# Patient Record
Sex: FEMALE | Race: BLACK OR AFRICAN AMERICAN | Employment: UNEMPLOYED | URBAN - METROPOLITAN AREA
[De-identification: names, ages, dates, MRNs, and addresses within clinical notes are randomized per-mention and may not be internally consistent; named-entity substitution may affect disease eponyms.]

---

## 2017-12-04 ENCOUNTER — HOSPITAL ENCOUNTER (INPATIENT)
Age: 47
LOS: 3 days | Discharge: HOME | DRG: 194 | End: 2017-12-07
Attending: EMERGENCY MEDICINE | Admitting: INTERNAL MEDICINE
Payer: MEDICARE

## 2017-12-04 ENCOUNTER — APPOINTMENT (OUTPATIENT)
Dept: GENERAL RADIOLOGY | Age: 47
DRG: 194 | End: 2017-12-04
Attending: EMERGENCY MEDICINE
Payer: MEDICARE

## 2017-12-04 DIAGNOSIS — E11.8 TYPE 2 DIABETES MELLITUS WITH COMPLICATION, WITH LONG-TERM CURRENT USE OF INSULIN (HCC): ICD-10-CM

## 2017-12-04 DIAGNOSIS — J45.901 ASTHMA WITH ACUTE EXACERBATION, UNSPECIFIED ASTHMA SEVERITY, UNSPECIFIED WHETHER PERSISTENT: ICD-10-CM

## 2017-12-04 DIAGNOSIS — Z79.4 TYPE 2 DIABETES MELLITUS WITH COMPLICATION, WITH LONG-TERM CURRENT USE OF INSULIN (HCC): ICD-10-CM

## 2017-12-04 DIAGNOSIS — J45.901 EXACERBATION OF PERSISTENT ASTHMA, UNSPECIFIED ASTHMA SEVERITY: ICD-10-CM

## 2017-12-04 DIAGNOSIS — J18.9 COMMUNITY ACQUIRED PNEUMONIA OF RIGHT LOWER LOBE OF LUNG: Primary | ICD-10-CM

## 2017-12-04 DIAGNOSIS — J15.9 COMMUNITY ACQUIRED BACTERIAL PNEUMONIA: ICD-10-CM

## 2017-12-04 PROBLEM — E66.9 OBESITY: Status: ACTIVE | Noted: 2017-12-04

## 2017-12-04 PROBLEM — E11.9 TYPE II DIABETES MELLITUS (HCC): Status: ACTIVE | Noted: 2017-12-04

## 2017-12-04 LAB
ALBUMIN SERPL-MCNC: 3.5 G/DL (ref 3.5–5)
ALBUMIN/GLOB SERPL: 0.9 {RATIO} (ref 1.2–3.5)
ALP SERPL-CCNC: 82 U/L (ref 50–136)
ALT SERPL-CCNC: 43 U/L (ref 12–65)
ANION GAP SERPL CALC-SCNC: 9 MMOL/L (ref 7–16)
AST SERPL-CCNC: 39 U/L (ref 15–37)
BASOPHILS # BLD: 0 K/UL (ref 0–0.2)
BASOPHILS NFR BLD: 0 % (ref 0–2)
BILIRUB SERPL-MCNC: 0.4 MG/DL (ref 0.2–1.1)
BUN SERPL-MCNC: 8 MG/DL (ref 6–23)
CALCIUM SERPL-MCNC: 8.6 MG/DL (ref 8.3–10.4)
CHLORIDE SERPL-SCNC: 105 MMOL/L (ref 98–107)
CO2 SERPL-SCNC: 26 MMOL/L (ref 21–32)
CREAT SERPL-MCNC: 0.69 MG/DL (ref 0.6–1)
DIFFERENTIAL METHOD BLD: ABNORMAL
EOSINOPHIL # BLD: 0.2 K/UL (ref 0–0.8)
EOSINOPHIL NFR BLD: 4 % (ref 0.5–7.8)
ERYTHROCYTE [DISTWIDTH] IN BLOOD BY AUTOMATED COUNT: 14.6 % (ref 11.9–14.6)
GLOBULIN SER CALC-MCNC: 3.7 G/DL (ref 2.3–3.5)
GLUCOSE BLD STRIP.AUTO-MCNC: 288 MG/DL (ref 65–100)
GLUCOSE BLD STRIP.AUTO-MCNC: 401 MG/DL (ref 65–100)
GLUCOSE SERPL-MCNC: 294 MG/DL (ref 65–100)
HCT VFR BLD AUTO: 36.9 % (ref 35.8–46.3)
HGB BLD-MCNC: 12.1 G/DL (ref 11.7–15.4)
IMM GRANULOCYTES # BLD: 0 K/UL (ref 0–0.5)
IMM GRANULOCYTES NFR BLD AUTO: 0 % (ref 0–5)
LACTATE BLD-SCNC: 1.3 MMOL/L (ref 0.5–1.9)
LYMPHOCYTES # BLD: 2.3 K/UL (ref 0.5–4.6)
LYMPHOCYTES NFR BLD: 45 % (ref 13–44)
MCH RBC QN AUTO: 28.1 PG (ref 26.1–32.9)
MCHC RBC AUTO-ENTMCNC: 32.8 G/DL (ref 31.4–35)
MCV RBC AUTO: 85.8 FL (ref 79.6–97.8)
MONOCYTES # BLD: 0.6 K/UL (ref 0.1–1.3)
MONOCYTES NFR BLD: 12 % (ref 4–12)
NEUTS SEG # BLD: 2 K/UL (ref 1.7–8.2)
NEUTS SEG NFR BLD: 39 % (ref 43–78)
PLATELET # BLD AUTO: 262 K/UL (ref 150–450)
PMV BLD AUTO: 11 FL (ref 10.8–14.1)
POTASSIUM SERPL-SCNC: 3.9 MMOL/L (ref 3.5–5.1)
PROCALCITONIN SERPL-MCNC: 0.1 NG/ML
PROT SERPL-MCNC: 7.2 G/DL (ref 6.3–8.2)
RBC # BLD AUTO: 4.3 M/UL (ref 4.05–5.25)
SODIUM SERPL-SCNC: 140 MMOL/L (ref 136–145)
WBC # BLD AUTO: 5.1 K/UL (ref 4.3–11.1)

## 2017-12-04 PROCEDURE — 83605 ASSAY OF LACTIC ACID: CPT

## 2017-12-04 PROCEDURE — 74011000258 HC RX REV CODE- 258: Performed by: PHYSICIAN ASSISTANT

## 2017-12-04 PROCEDURE — 74011250637 HC RX REV CODE- 250/637: Performed by: INTERNAL MEDICINE

## 2017-12-04 PROCEDURE — 94760 N-INVAS EAR/PLS OXIMETRY 1: CPT

## 2017-12-04 PROCEDURE — 94640 AIRWAY INHALATION TREATMENT: CPT

## 2017-12-04 PROCEDURE — 65270000029 HC RM PRIVATE

## 2017-12-04 PROCEDURE — 96365 THER/PROPH/DIAG IV INF INIT: CPT | Performed by: PHYSICIAN ASSISTANT

## 2017-12-04 PROCEDURE — 84145 PROCALCITONIN (PCT): CPT | Performed by: PHYSICIAN ASSISTANT

## 2017-12-04 PROCEDURE — 74011250636 HC RX REV CODE- 250/636: Performed by: INTERNAL MEDICINE

## 2017-12-04 PROCEDURE — 74011636637 HC RX REV CODE- 636/637: Performed by: INTERNAL MEDICINE

## 2017-12-04 PROCEDURE — 87040 BLOOD CULTURE FOR BACTERIA: CPT | Performed by: PHYSICIAN ASSISTANT

## 2017-12-04 PROCEDURE — 74011000250 HC RX REV CODE- 250: Performed by: PHYSICIAN ASSISTANT

## 2017-12-04 PROCEDURE — 71020 XR CHEST PA LAT: CPT

## 2017-12-04 PROCEDURE — 80053 COMPREHEN METABOLIC PANEL: CPT | Performed by: PHYSICIAN ASSISTANT

## 2017-12-04 PROCEDURE — 85025 COMPLETE CBC W/AUTO DIFF WBC: CPT | Performed by: PHYSICIAN ASSISTANT

## 2017-12-04 PROCEDURE — 96375 TX/PRO/DX INJ NEW DRUG ADDON: CPT | Performed by: PHYSICIAN ASSISTANT

## 2017-12-04 PROCEDURE — 82962 GLUCOSE BLOOD TEST: CPT

## 2017-12-04 PROCEDURE — 74011250636 HC RX REV CODE- 250/636: Performed by: PHYSICIAN ASSISTANT

## 2017-12-04 PROCEDURE — 99284 EMERGENCY DEPT VISIT MOD MDM: CPT | Performed by: PHYSICIAN ASSISTANT

## 2017-12-04 PROCEDURE — 74011000250 HC RX REV CODE- 250: Performed by: INTERNAL MEDICINE

## 2017-12-04 RX ORDER — ALBUTEROL SULFATE 0.83 MG/ML
2.5 SOLUTION RESPIRATORY (INHALATION)
Status: COMPLETED | OUTPATIENT
Start: 2017-12-04 | End: 2017-12-04

## 2017-12-04 RX ORDER — SERTRALINE HYDROCHLORIDE 50 MG/1
50 TABLET, FILM COATED ORAL DAILY
Status: DISCONTINUED | OUTPATIENT
Start: 2017-12-05 | End: 2017-12-07 | Stop reason: HOSPADM

## 2017-12-04 RX ORDER — LOSARTAN POTASSIUM 25 MG/1
TABLET ORAL DAILY
COMMUNITY

## 2017-12-04 RX ORDER — PANTOPRAZOLE SODIUM 40 MG/1
40 TABLET, DELAYED RELEASE ORAL DAILY
COMMUNITY

## 2017-12-04 RX ORDER — BENZONATATE 100 MG/1
100 CAPSULE ORAL
COMMUNITY
End: 2020-11-07 | Stop reason: SDUPTHER

## 2017-12-04 RX ORDER — RANITIDINE 150 MG/1
150 TABLET, FILM COATED ORAL 2 TIMES DAILY
Status: ON HOLD | COMMUNITY
End: 2017-12-07

## 2017-12-04 RX ORDER — ENOXAPARIN SODIUM 100 MG/ML
40 INJECTION SUBCUTANEOUS EVERY 12 HOURS
Status: DISCONTINUED | OUTPATIENT
Start: 2017-12-04 | End: 2017-12-07 | Stop reason: HOSPADM

## 2017-12-04 RX ORDER — OXYCODONE AND ACETAMINOPHEN 10; 325 MG/1; MG/1
1 TABLET ORAL
Status: DISCONTINUED | OUTPATIENT
Start: 2017-12-04 | End: 2017-12-07 | Stop reason: HOSPADM

## 2017-12-04 RX ORDER — IPRATROPIUM BROMIDE AND ALBUTEROL SULFATE 2.5; .5 MG/3ML; MG/3ML
3 SOLUTION RESPIRATORY (INHALATION)
Status: DISCONTINUED | OUTPATIENT
Start: 2017-12-04 | End: 2017-12-07 | Stop reason: HOSPADM

## 2017-12-04 RX ORDER — SODIUM CHLORIDE 0.9 % (FLUSH) 0.9 %
5-10 SYRINGE (ML) INJECTION AS NEEDED
Status: DISCONTINUED | OUTPATIENT
Start: 2017-12-04 | End: 2017-12-07 | Stop reason: HOSPADM

## 2017-12-04 RX ORDER — INSULIN ASPART 100 [IU]/ML
INJECTION, SOLUTION INTRAVENOUS; SUBCUTANEOUS
COMMUNITY

## 2017-12-04 RX ORDER — CEFTRIAXONE 1 G/1
1 INJECTION, POWDER, FOR SOLUTION INTRAMUSCULAR; INTRAVENOUS ONCE
Status: DISCONTINUED | OUTPATIENT
Start: 2017-12-04 | End: 2017-12-04 | Stop reason: SDUPTHER

## 2017-12-04 RX ORDER — BISOPROLOL FUMARATE AND HYDROCHLOROTHIAZIDE 5; 6.25 MG/1; MG/1
1 TABLET ORAL DAILY
Status: DISCONTINUED | OUTPATIENT
Start: 2017-12-05 | End: 2017-12-07 | Stop reason: HOSPADM

## 2017-12-04 RX ORDER — BUDESONIDE AND FORMOTEROL FUMARATE DIHYDRATE 160; 4.5 UG/1; UG/1
2 AEROSOL RESPIRATORY (INHALATION) 2 TIMES DAILY
COMMUNITY

## 2017-12-04 RX ORDER — SODIUM CHLORIDE 0.9 % (FLUSH) 0.9 %
5-10 SYRINGE (ML) INJECTION EVERY 8 HOURS
Status: DISCONTINUED | OUTPATIENT
Start: 2017-12-04 | End: 2017-12-07 | Stop reason: HOSPADM

## 2017-12-04 RX ORDER — IPRATROPIUM BROMIDE AND ALBUTEROL SULFATE 2.5; .5 MG/3ML; MG/3ML
3 SOLUTION RESPIRATORY (INHALATION)
Status: COMPLETED | OUTPATIENT
Start: 2017-12-04 | End: 2017-12-04

## 2017-12-04 RX ORDER — ONDANSETRON 2 MG/ML
4 INJECTION INTRAMUSCULAR; INTRAVENOUS
Status: DISCONTINUED | OUTPATIENT
Start: 2017-12-04 | End: 2017-12-07 | Stop reason: HOSPADM

## 2017-12-04 RX ADMIN — OXYCODONE HYDROCHLORIDE AND ACETAMINOPHEN 1 TABLET: 10; 325 TABLET ORAL at 22:48

## 2017-12-04 RX ADMIN — AZITHROMYCIN MONOHYDRATE 500 MG: 500 INJECTION, POWDER, LYOPHILIZED, FOR SOLUTION INTRAVENOUS at 22:44

## 2017-12-04 RX ADMIN — IPRATROPIUM BROMIDE AND ALBUTEROL SULFATE 3 ML: .5; 3 SOLUTION RESPIRATORY (INHALATION) at 16:29

## 2017-12-04 RX ADMIN — INSULIN HUMAN 10 UNITS: 100 INJECTION, SOLUTION PARENTERAL at 23:20

## 2017-12-04 RX ADMIN — ENOXAPARIN SODIUM 40 MG: 40 INJECTION SUBCUTANEOUS at 22:48

## 2017-12-04 RX ADMIN — ALBUTEROL SULFATE 2.5 MG: 2.5 SOLUTION RESPIRATORY (INHALATION) at 17:30

## 2017-12-04 RX ADMIN — ONDANSETRON 4 MG: 2 INJECTION INTRAMUSCULAR; INTRAVENOUS at 23:19

## 2017-12-04 RX ADMIN — IPRATROPIUM BROMIDE AND ALBUTEROL SULFATE 3 ML: .5; 3 SOLUTION RESPIRATORY (INHALATION) at 22:08

## 2017-12-04 RX ADMIN — Medication 10 ML: at 22:50

## 2017-12-04 RX ADMIN — CEFTRIAXONE 1 G: 1 INJECTION, POWDER, FOR SOLUTION INTRAMUSCULAR; INTRAVENOUS at 17:15

## 2017-12-04 RX ADMIN — METHYLPREDNISOLONE SODIUM SUCCINATE 125 MG: 125 INJECTION, POWDER, FOR SOLUTION INTRAMUSCULAR; INTRAVENOUS at 17:00

## 2017-12-04 NOTE — ED PROVIDER NOTES
HPI Comments: 41-year-old -American female with history of breast cancer in remission for 10 years, asthma, and insulin-dependent diabetes mellitus who was at [facility] ER on 11/30 with a negative chest x-ray at that time presents today stating that she has persistence of a productive cough and shortness of breath and is expectorating brown sputum. She states that she also has some blood streaked sputum. She states that she started 4 months ago with a sinusitis and was treated with amoxicillin 875 mg twice a day. She states the cough has been recurrent and is not relieved even with an albuterol or Symbicort inhaler. Patient states that she was not placed on antibiotics on 11/30 when she was at [facility].  Her chest x-ray in the ER today shows a right basilar infiltrate. Clinical exam with extensive inspiratory expiratory wheezing bilaterally. Patient is a 49 y.o. female presenting with cough. The history is provided by the patient. Cough   This is a recurrent problem. The current episode started more than 1 week ago (4 Months). The problem occurs constantly. The problem has been gradually worsening. The cough is productive of brown sputum. Patient reports a subjective fever - was not measured. The fever has been present for 5 days or more. Associated symptoms include chills, sweats, rhinorrhea, shortness of breath and wheezing. Pertinent negatives include no chest pain, no eye redness, no ear congestion, no ear pain, no headaches, no sore throat, no myalgias, no nausea and no vomiting. She has tried decongestants, cough syrup, steroids and inhalers for the symptoms. The treatment provided no relief. She is not a smoker. Her past medical history is significant for asthma.         Past Medical History:   Diagnosis Date    Cancer ([facility] [address]     left breaast    Diabetes ([facility] [address]     Gastrointestinal disorder     GERD    Hypertension        Past Surgical History: Procedure Laterality Date    BREAST SURGERY PROCEDURE UNLISTED      left mastectomy    HX GYN      c section    HX ORTHOPAEDIC      bilateral knee         History reviewed. No pertinent family history. Social History     Social History    Marital status: LEGALLY      Spouse name: N/A    Number of children: N/A    Years of education: N/A     Occupational History    Not on file. Social History Main Topics    Smoking status: Never Smoker    Smokeless tobacco: Never Used    Alcohol use No    Drug use: No    Sexual activity: Not on file     Other Topics Concern    Not on file     Social History Narrative         ALLERGIES: Adhesive tape    Review of Systems   Constitutional: Positive for chills. Negative for diaphoresis, fatigue and fever. HENT: Positive for congestion, postnasal drip, rhinorrhea, sinus pain, sinus pressure and sneezing. Negative for ear pain, sore throat, trouble swallowing and voice change. Eyes: Negative. Negative for redness. Respiratory: Positive for cough, shortness of breath and wheezing. Cardiovascular: Negative. Negative for chest pain and palpitations. Gastrointestinal: Negative. Negative for nausea and vomiting. Genitourinary: Negative. Musculoskeletal: Negative. Negative for myalgias. Skin: Negative. Neurological: Positive for dizziness and light-headedness. Negative for syncope, speech difficulty, weakness, numbness and headaches. Hematological: Negative. Psychiatric/Behavioral: Negative. All other systems reviewed and are negative. Vitals:    12/04/17 1431   BP: (!) 161/99   Pulse: (!) 103   Resp: 20   Temp: 99.1 °F (37.3 °C)   SpO2: 98%   Weight: (!) 163.3 kg (360 lb)   Height: 5' 5\" (1.651 m)            Physical Exam   Constitutional: She is oriented to person, place, and time. Non-toxic appearance. She appears ill. She appears distressed. Patient is morbidly obese.   She is observed to be in obvious respiratory discomfort sitting on exam stretcher. Patient with obvious retractions and wheezing. She had difficulty speaking in full complete sentences due to wheezing and  Shortness of breath. Patient is also tachycardic. HENT:   Head: Normocephalic and atraumatic. Right Ear: Tympanic membrane normal.   Left Ear: Tympanic membrane normal.   Nose: Mucosal edema and rhinorrhea present. Mouth/Throat: Uvula is midline, oropharynx is clear and moist and mucous membranes are normal.   Eyes: Conjunctivae and EOM are normal. Pupils are equal, round, and reactive to light. Neck: Trachea normal, normal range of motion and full passive range of motion without pain. Neck supple. No JVD present. No spinous process tenderness and no muscular tenderness present. Cardiovascular: Regular rhythm and intact distal pulses. Tachycardia present. Exam reveals no gallop and no friction rub. No murmur heard. Pulmonary/Chest: Accessory muscle usage present. She is in respiratory distress. She has decreased breath sounds. She has wheezes. She has rhonchi. She has no rales. Patient with inspiratory and expiratory wheezes bilaterally. Abdominal: Soft. Bowel sounds are normal. She exhibits no distension. There is no tenderness. There is no rebound and no guarding. Musculoskeletal: Normal range of motion. She exhibits no edema. Lymphadenopathy:     She has no cervical adenopathy. Neurological: She is alert and oriented to person, place, and time. Skin: Skin is warm and dry. Psychiatric: She has a normal mood and affect. Her behavior is normal.   Nursing note and vitals reviewed.        MDM  Number of Diagnoses or Management Options  Community acquired pneumonia of right lower lobe of lung ([address]): new and requires workup  Exacerbation of persistent asthma, unspecified asthma severity: new and requires workup  Type 2 diabetes mellitus with complication, with long-term current use of insulin ([address]): established and worsening  Diagnosis management comments: Patient had both DuoNeb and continuous albuterol treatment. She persisted to be symptomatic with inspiratory and expiratory wheezes following the treatment. Her SaO2 remained stable, however she was subjectively and objectively short of breath. Patient was also administered Solu-Medrol 125 mg IM ×1. Her blood glucose is elevated to 288. Patient received Rocephin 1 g IV ×1 in the ER. I spoke with the intensivist who agreed to accept her to his service for intractable wheezing with asthma exacerbation and right lower lobe community acquired pneumonia. Amount and/or Complexity of Data Reviewed  Clinical lab tests: ordered and reviewed  Tests in the radiology section of CPT®: ordered and reviewed  Review and summarize past medical records: yes  Discuss the patient with other providers: yes    Risk of Complications, Morbidity, and/or Mortality  Presenting problems: high  Diagnostic procedures: moderate  Management options: high    Patient Progress  Patient progress: stable    ED Course       Procedures      Recent Results (from the past 12 hour(s))   CBC WITH AUTOMATED DIFF    Collection Time: 12/04/17  5:43 PM   Result Value Ref Range    WBC 5.1 4.3 - 11.1 K/uL    RBC 4.30 4.05 - 5.25 M/uL    HGB 12.1 11.7 - 15.4 g/dL    HCT 36.9 35.8 - 46.3 %    MCV 85.8 79.6 - 97.8 FL    MCH 28.1 26.1 - 32.9 PG    MCHC 32.8 31.4 - 35.0 g/dL    RDW 14.6 11.9 - 14.6 %    PLATELET 702 225 - 808 K/uL    MPV 11.0 10.8 - 14.1 FL    DF AUTOMATED      NEUTROPHILS 39 (L) 43 - 78 %    LYMPHOCYTES 45 (H) 13 - 44 %    MONOCYTES 12 4.0 - 12.0 %    EOSINOPHILS 4 0.5 - 7.8 %    BASOPHILS 0 0.0 - 2.0 %    IMMATURE GRANULOCYTES 0 0.0 - 5.0 %    ABS. NEUTROPHILS 2.0 1.7 - 8.2 K/UL    ABS. LYMPHOCYTES 2.3 0.5 - 4.6 K/UL    ABS. MONOCYTES 0.6 0.1 - 1.3 K/UL    ABS. EOSINOPHILS 0.2 0.0 - 0.8 K/UL    ABS. BASOPHILS 0.0 0.0 - 0.2 K/UL    ABS. IMM.  GRANS. 0.0 0.0 - 0.5 K/UL   METABOLIC PANEL, COMPREHENSIVE    Collection Time: 12/04/17  5:43 PM   Result Value Ref Range    Sodium 140 136 - 145 mmol/L    Potassium 3.9 3.5 - 5.1 mmol/L    Chloride 105 98 - 107 mmol/L    CO2 26 21 - 32 mmol/L    Anion gap 9 7 - 16 mmol/L    Glucose 294 (H) 65 - 100 mg/dL    BUN 8 6 - 23 MG/DL    Creatinine 0.69 0.6 - 1.0 MG/DL    GFR est AA >60 >60 ml/min/1.73m2    GFR est non-AA >60 >60 ml/min/1.73m2    Calcium 8.6 8.3 - 10.4 MG/DL    Bilirubin, total 0.4 0.2 - 1.1 MG/DL    ALT (SGPT) 43 12 - 65 U/L    AST (SGOT) 39 (H) 15 - 37 U/L    Alk. phosphatase 82 50 - 136 U/L    Protein, total 7.2 6.3 - 8.2 g/dL    Albumin 3.5 3.5 - 5.0 g/dL    Globulin 3.7 (H) 2.3 - 3.5 g/dL    A-G Ratio 0.9 (L) 1.2 - 3.5     PROCALCITONIN    Collection Time: 12/04/17  5:43 PM   Result Value Ref Range    Procalcitonin 0.1 ng/mL   GLUCOSE, POC    Collection Time: 12/04/17  5:51 PM   Result Value Ref Range    Glucose (POC) 288 (H) 65 - 100 mg/dL   POC LACTIC ACID    Collection Time: 12/04/17  5:52 PM   Result Value Ref Range    Lactic Acid (POC) 1.3 0.5 - 1.9 mmol/L        XR CHEST PA LAT   Final Result   IMPRESSION: Right basilar infiltrate.

## 2017-12-04 NOTE — ED NOTES
Pt feeling somewhat better after first neb tx, but still with wheezing, requesting a second tx. RT notified.

## 2017-12-04 NOTE — IP AVS SNAPSHOT
[address] [facility] 
[phone] Patient: Campbell Favre MRN: [id number] :1970 About your hospitalization You were admitted on:  2017 You last received care in the:  [facility] 7 MED SURG You were discharged on:  2017 Why you were hospitalized Your primary diagnosis was:  Community Acquired Bacterial Pneumonia Your diagnoses also included:  Acute Asthma Exacerbation, Type Ii Diabetes Mellitus (Hcc), Obesity Things You Need To Do (next 8 weeks) Monday Dec 11, 2017 New Patient with Max Shepherd MD at  4:20 PM  
Where:  [address] [place] ([address]) Monday Dec 18, 2017 HOSPITAL with JACQUELINE Oconnor at  9:20 AM (Arrive by 8:50 AM) Where:  [facility] ([facility]) Follow up with [facility]  
at 8:50 a.m with Mariah Meyers Phone:  [phone] Where:  [address] [address] Wednesday Dec 27, 2017 Follow Up with  SLEEP LUL YAN at  9:20 AM (Arrive by 9:15 AM) Where:  [address] ([facility]) Follow up with [facility]  
at 9:15 a.m. Please bring records from sleep study that was previously done, and take them to the office before appointment, BRING bi-pap to appointment Phone:  [phone] Where:  [address] [address] Discharge Orders None A check tawanna indicates which time of day the medication should be taken. My Medications STOP taking these medications   
 azithromycin 250 mg tablet Commonly known as:  ZITHROMAX Z-STEFFI  
   
  
  
TAKE these medications as instructed Instructions Each Dose to Equal  
 Morning Noon Evening Bedtime  
 albuterol 2.5 mg /3 mL (0.083 %) nebulizer solution Commonly known as:  PROVENTIL VENTOLIN  
   
 3 mL by Nebulization route four (4) times daily. 2.5 mg  
    
   
   
   
  
 albuterol 90 mcg/actuation inhaler Commonly known as:  Ashley Deep Your next dose is: Take on as needed schedule Take 1-2 Puffs by inhalation every four (4) hours as needed for Wheezing. 1-2 Puff  
    
   
   
   
  
 bisoprolol-hydroCHLOROthiazide 5-6.25 mg per tablet Commonly known as:  [facility] Your next dose is:  Tomorrow Morning Take 1 Tab by mouth daily. 1 Tab  
    
  
   
   
   
  
 cefpodoxime 200 mg tablet Commonly known as:  Margueritte Sykes Take 0.5 Tabs by mouth two (2) times a day. 100 mg  
    
   
   
   
  
 fexofenadine-pseudoephedrine 180-240 mg per tablet Commonly known as:  ALLEGRA-D 24 Your next dose is:  Tomorrow Morning Take 1 Tab by mouth daily. 1 Tab FLEXERIL 5 mg tablet Generic drug:  cyclobenzaprine Your next dose is: This evening 
  
   
 take 5 mg by mouth two (2) times a day. 5 mg  
    
  
   
   
  
   
  
 fluconazole 150 mg tablet Commonly known as:  DIFLUCAN Take 1 Tab by mouth once for 1 dose. TAKE ONE TAB FOR CANDIDIASIS. MAY REPEAT X 1 IF NEEDED. 150 mg  
    
   
   
   
  
 insulin aspart 100 unit/mL Inpn Commonly known as:  Dinesh Vieira Your next dose is:  Resume home schedule 
  
   
 by SubCUTAneous route. LASIX 20 mg tablet Generic drug:  furosemide Your next dose is:  Tomorrow Morning Take 40 mg by mouth daily. 40 mg  
    
  
   
   
   
  
 LEVEMIR 100 unit/mL injection Generic drug:  insulin detemir Your next dose is: Take tonight  
  
   
 by SubCUTAneous route nightly. Liraglutide 0.6 mg/0.1 mL (18 mg/3 mL) Pnij Commonly known as:  Gattis Halon Your next dose is:  Resume home schedule 
  
   
 0.6 mg by SubCUTAneous route.   
 0.6 mg  
    
   
   
   
  
 losartan 25 mg tablet Commonly known as:  COZAAR Your next dose is:  Tomorrow Morning Take  by mouth daily. magic mouthwash Susp Commonly known as:  [place] Take 10 mL by mouth every four (4) hours as needed. 10 mL  
    
   
   
   
  
 metFORMIN 500 mg tablet Commonly known as:  GLUCOPHAGE Your next dose is: This evening Take 1,000 mg by mouth two (2) times daily (with meals). 1000 mg MOBIC 7.5 mg tablet Generic drug:  meloxicam  
Your next dose is: This evening 
  
   
 take 7.5 mg by mouth two (2) times a day. 7.5 mg  
    
  
   
   
  
   
  
 NEURONTIN PO Your next dose is:  Resume home schedule Take  by mouth. OTHER Takes insulin that starts with L, and starts with N  
     
   
   
   
  
 PERCOCET  mg per tablet Generic drug:  oxyCODONE-acetaminophen Your next dose is: Take on as needed schedule Take 1 Tab by mouth. 1 Tab  
    
   
   
   
  
 predniSONE 10 mg tablet Commonly known as:  Marla Patricio Take 1 Tab by mouth daily (with breakfast). 40mg per day for 3 days, then 30mg per day for 3 days, 20mg per day for 3 days then 10mg per day for 3 days, then stop. 10 mg PROTONIX 40 mg tablet Generic drug:  pantoprazole Take 40 mg by mouth daily. 40 mg  
    
   
   
   
  
 raNITIdine 150 mg tablet Commonly known as:  ZANTAC Take 1 Tab by mouth two (2) times daily as needed for Indigestion. 150 mg  
    
   
   
   
  
 sertraline 50 mg tablet Commonly known as:  ZOLOFT Your next dose is:  Tomorrow Morning Take 50 mg by mouth daily. 50 mg  
    
  
   
   
   
  
 SYMBICORT 160-4.5 mcg/actuation Hfaa Generic drug:  budesonide-formoterol Your next dose is: This evening Take 2 Puffs by inhalation two (2) times a day. 2 Puff TESSALON PERLES 100 mg capsule Generic drug:  benzonatate Your next dose is: Take on as needed schedule Take 100 mg by mouth three (3) times daily as needed for Cough. 100 mg Where to Get Your Medications Information on where to get these meds will be given to you by the nurse or doctor. ! Ask your nurse or doctor about these medications  
  albuterol 2.5 mg /3 mL (0.083 %) nebulizer solution  
 cefpodoxime 200 mg tablet  
 fluconazole 150 mg tablet  
 magic mouthwash Susp  
 predniSONE 10 mg tablet  
 raNITIdine 150 mg tablet Discharge Instructions Pneumonia: Care Instructions Your Care Instructions Pneumonia is an infection of the lungs. Most cases are caused by infections from bacteria or viruses. Pneumonia may be mild or very severe. If it is caused by bacteria, you will be treated with antibiotics. It may take a few weeks to a few months to recover fully from pneumonia, depending on how sick you were and whether your overall health is good. Follow-up care is a key part of your treatment and safety. Be sure to make and go to all appointments, and call your doctor if you are having problems. It's also a good idea to know your test results and keep a list of the medicines you take. How can you care for yourself at home? · Take your antibiotics exactly as directed. Do not stop taking the medicine just because you are feeling better. You need to take the full course of antibiotics. · Take your medicines exactly as prescribed. Call your doctor if you think you are having a problem with your medicine. · Get plenty of rest and sleep. You may feel weak and tired for a while, but your energy level will improve with time. · To prevent dehydration, drink plenty of fluids, enough so that your urine is light yellow or clear like water. Choose water and other caffeine-free clear liquids until you feel better.  If you have kidney, heart, or liver disease and have to limit fluids, talk with your doctor before you increase the amount of fluids you drink. · Take care of your cough so you can rest. A cough that brings up mucus from your lungs is common with pneumonia. It is one way your body gets rid of the infection. But if coughing keeps you from resting or causes severe fatigue and chest-wall pain, talk to your doctor. He or she may suggest that you take a medicine to reduce the cough. · Use a vaporizer or humidifier to add moisture to your bedroom. Follow the directions for cleaning the machine. · Do not smoke or allow others to smoke around you. Smoke will make your cough last longer. If you need help quitting, talk to your doctor about stop-smoking programs and medicines. These can increase your chances of quitting for good. · Take an over-the-counter pain medicine, such as acetaminophen (Tylenol), ibuprofen (Advil, Motrin), or naproxen (Aleve). Read and follow all instructions on the label. · Do not take two or more pain medicines at the same time unless the doctor told you to. Many pain medicines have acetaminophen, which is Tylenol. Too much acetaminophen (Tylenol) can be harmful. · If you were given a spirometer to measure how well your lungs are working, use it as instructed. This can help your doctor tell how your recovery is going. · To prevent pneumonia in the future, talk to your doctor about getting a flu vaccine (once a year) and a pneumococcal vaccine (one time only for most people). When should you call for help? Call 911 anytime you think you may need emergency care. For example, call if: 
? · You have severe trouble breathing. ?Call your doctor now or seek immediate medical care if: 
? · You cough up dark brown or bloody mucus (sputum). ? · You have new or worse trouble breathing. ? · You are dizzy or lightheaded, or you feel like you may faint. ?Watch closely for changes in your health, and be sure to contact your doctor if: 
? · You have a new or higher fever. ? · You are coughing more deeply or more often. ? · You are not getting better after 2 days (48 hours). ? · You do not get better as expected. Where can you learn more? Go to http://amarilys-thee.info/. Enter [id number] in the search box to learn more about \"Pneumonia: Care Instructions. \" Current as of: May 12, 2017 Content Version: 11.4 © 6890-1553 [employer]. Care instructions adapted under license by [employer] (which disclaims liability or warranty for this information). If you have questions about a medical condition or this instruction, always ask your healthcare professional. [address] any warranty or liability for your use of this information. DISCHARGE SUMMARY from Nurse PATIENT INSTRUCTIONS: 
 
After general anesthesia or intravenous sedation, for 24 hours or while taking prescription Narcotics: · Limit your activities · Do not drive and operate hazardous machinery · Do not make important personal or business decisions · Do  not drink alcoholic beverages · If you have not urinated within 8 hours after discharge, please contact your surgeon on call. Report the following to your surgeon: 
· Excessive pain, swelling, redness or odor of or around the surgical area · Temperature over 100.5 · Nausea and vomiting lasting longer than 4 hours or if unable to take medications · Any signs of decreased circulation or nerve impairment to extremity: change in color, persistent  numbness, tingling, coldness or increase pain · Any questions What to do at Home: 
Recommended activity: Activity as tolerated, diabetic diet as tolerated. *  Please give a list of your current medications to your Primary Care Provider.  
 
*  Please update this list whenever your medications are discontinued, doses are 
 changed, or new medications (including over-the-counter products) are added. *  Please carry medication information at all times in case of emergency situations. These are general instructions for a healthy lifestyle: No smoking/ No tobacco products/ Avoid exposure to second hand smoke Surgeon General's Warning:  Quitting smoking now greatly reduces serious risk to your health. Obesity, smoking, and sedentary lifestyle greatly increases your risk for illness A healthy diet, regular physical exercise & weight monitoring are important for maintaining a healthy lifestyle You may be retaining fluid if you have a history of heart failure or if you experience any of the following symptoms:  Weight gain of 3 pounds or more overnight or 5 pounds in a week, increased swelling in our hands or feet or shortness of breath while lying flat in bed. Please call your doctor as soon as you notice any of these symptoms; do not wait until your next office visit. Recognize signs and symptoms of STROKE: 
 
F-face looks uneven A-arms unable to move or move unevenly S-speech slurred or non-existent T-time-call 911 as soon as signs and symptoms begin-DO NOT go Back to bed or wait to see if you get better-TIME IS BRAIN. Warning Signs of HEART ATTACK Call 911 if you have these symptoms: 
? Chest discomfort. Most heart attacks involve discomfort in the center of the chest that lasts more than a few minutes, or that goes away and comes back. It can feel like uncomfortable pressure, squeezing, fullness, or pain. ? Discomfort in other areas of the upper body. Symptoms can include pain or discomfort in one or both arms, the back, neck, jaw, or stomach. ? Shortness of breath with or without chest discomfort. ? Other signs may include breaking out in a cold sweat, nausea, or lightheadedness. Don't wait more than five minutes to call [address]!  Fast action can save your life. Calling 911 is almost always the fastest way to get lifesaving treatment. Emergency Medical Services staff can begin treatment when they arrive  up to an hour sooner than if someone gets to the hospital by car. The discharge information has been reviewed with the patient. The patient verbalized understanding. Discharge medications reviewed with the patient and appropriate educational materials and side effects teaching were provided. ___________________________________________________________________________________________________________________________________ [employer] Announcement We are excited to announce that we are making your provider's discharge notes available to you in [employer]. You will see these notes when they are completed and signed by the physician that discharged you from your recent hospital stay. If you have any questions or concerns about any information you see in [employer], please call the Health Information Department where you were seen or reach out to your Primary Care Provider for more information about your plan of care. Introducing [facility]! Petra Romero introduces [employer] patient portal. Now you can access parts of your medical record, email your doctor's office, and request medication refills online. 1. In your internet browser, go to [email or web address] 2. Click on the First Time User? Click Here link in the Sign In box. You will see the New Member Sign Up page. 3. Enter your [employer] Access Code exactly as it appears below. You will not need to use this code after youve completed the sign-up process. If you do not sign up before the expiration date, you must request a new code. · [employer] Access Code: [email or web address] Expires: 3/4/2018  2:17 PM 
 
4. Enter the last four digits of your Social Security Number (xxxx) and Date of Birth (mm/dd/yyyy) as indicated and click Submit.  You will be taken to the next sign-up page. 5. Create a [employer] ID. This will be your [employer] login ID and cannot be changed, so think of one that is secure and easy to remember. 6. Create a [employer] password. You can change your password at any time. 7. Enter your Password Reset Question and Answer. This can be used at a later time if you forget your password. 8. Enter your e-mail address. You will receive e-mail notification when new information is available in [address] 9. Click Sign Up. You can now view and download portions of your medical record. 10. Click the Download Summary menu link to download a portable copy of your medical information. If you have questions, please visit the Frequently Asked Questions section of the [employer] website. Remember, [employer] is NOT to be used for urgent needs. For medical emergencies, dial 911. Now available from your iPhone and Android! Unresulted Labs-Please follow up with your PCP about these lab tests Order Current Status CULTURE, BLOOD Preliminary result CULTURE, BLOOD Preliminary result Providers Seen During Your Hospitalization Provider Specialty Primary office phone Carlin Contreras MD Emergency Medicine [phone] Josselyn Arroyo MD Emergency Medicine [phone] Patience Vitale MD Pulmonary Disease [phone] Your Primary Care Physician (PCP) Primary Care Physician Office Phone Office Fax Kimmy Limon [phone] You are allergic to the following Allergen Reactions Adhesive Tape Rash Recent Documentation Height Weight BMI OB Status Smoking Status 1.651 m (!) 163.3 kg 59.91 kg/m2 Needs review Never Smoker Emergency Contacts Name Discharge Info Relation Home Work Mobile [id number] [employer]  Parent [1] [phone] Patient Belongings The following personal items are in your possession at time of discharge: Dental Appliances: None         Home Medications: None   Jewelry: With patient  Clothing: At bedside    Other Valuables: At bedside Please provide this summary of care documentation to your next provider. Signatures-by signing, you are acknowledging that this After Visit Summary has been reviewed with you and you have received a copy. Patient Signature:  ____________________________________________________________ Date:  ____________________________________________________________  
  
Bethel Moulding Provider Signature:  ____________________________________________________________ Date:  ____________________________________________________________

## 2017-12-04 NOTE — ED TRIAGE NOTES
Patient states went to [facility] on Wednesday diagnosed with bronchitis. Patient states cough that started on Thursday. States attempt mucinex. Patient states coughing up brown mucous. States chest hurts from coughing.

## 2017-12-05 PROBLEM — E66.9 OBESITY: Chronic | Status: ACTIVE | Noted: 2017-12-04

## 2017-12-05 PROBLEM — E11.9 TYPE II DIABETES MELLITUS (HCC): Chronic | Status: ACTIVE | Noted: 2017-12-04

## 2017-12-05 LAB
ANION GAP SERPL CALC-SCNC: 10 MMOL/L (ref 7–16)
BUN SERPL-MCNC: 9 MG/DL (ref 6–23)
CALCIUM SERPL-MCNC: 8.5 MG/DL (ref 8.3–10.4)
CHLORIDE SERPL-SCNC: 104 MMOL/L (ref 98–107)
CO2 SERPL-SCNC: 25 MMOL/L (ref 21–32)
CREAT SERPL-MCNC: 0.69 MG/DL (ref 0.6–1)
ERYTHROCYTE [DISTWIDTH] IN BLOOD BY AUTOMATED COUNT: 14.4 % (ref 11.9–14.6)
GLUCOSE BLD STRIP.AUTO-MCNC: 209 MG/DL (ref 65–100)
GLUCOSE BLD STRIP.AUTO-MCNC: 242 MG/DL (ref 65–100)
GLUCOSE BLD STRIP.AUTO-MCNC: 246 MG/DL (ref 65–100)
GLUCOSE BLD STRIP.AUTO-MCNC: 267 MG/DL (ref 65–100)
GLUCOSE BLD STRIP.AUTO-MCNC: 288 MG/DL (ref 65–100)
GLUCOSE BLD STRIP.AUTO-MCNC: 318 MG/DL (ref 65–100)
GLUCOSE BLD STRIP.AUTO-MCNC: 427 MG/DL (ref 65–100)
GLUCOSE SERPL-MCNC: 255 MG/DL (ref 65–100)
HCT VFR BLD AUTO: 37.2 % (ref 35.8–46.3)
HGB BLD-MCNC: 11.8 G/DL (ref 11.7–15.4)
MCH RBC QN AUTO: 27.6 PG (ref 26.1–32.9)
MCHC RBC AUTO-ENTMCNC: 31.7 G/DL (ref 31.4–35)
MCV RBC AUTO: 86.9 FL (ref 79.6–97.8)
PLATELET # BLD AUTO: 276 K/UL (ref 150–450)
PMV BLD AUTO: 11.4 FL (ref 10.8–14.1)
POTASSIUM SERPL-SCNC: 4.5 MMOL/L (ref 3.5–5.1)
RBC # BLD AUTO: 4.28 M/UL (ref 4.05–5.25)
SODIUM SERPL-SCNC: 139 MMOL/L (ref 136–145)
WBC # BLD AUTO: 6.8 K/UL (ref 4.3–11.1)

## 2017-12-05 PROCEDURE — 74011250637 HC RX REV CODE- 250/637: Performed by: INTERNAL MEDICINE

## 2017-12-05 PROCEDURE — 94760 N-INVAS EAR/PLS OXIMETRY 1: CPT

## 2017-12-05 PROCEDURE — 65270000029 HC RM PRIVATE

## 2017-12-05 PROCEDURE — 74011636637 HC RX REV CODE- 636/637: Performed by: NURSE PRACTITIONER

## 2017-12-05 PROCEDURE — 36415 COLL VENOUS BLD VENIPUNCTURE: CPT | Performed by: INTERNAL MEDICINE

## 2017-12-05 PROCEDURE — 82962 GLUCOSE BLOOD TEST: CPT

## 2017-12-05 PROCEDURE — 80048 BASIC METABOLIC PNL TOTAL CA: CPT | Performed by: INTERNAL MEDICINE

## 2017-12-05 PROCEDURE — 85027 COMPLETE CBC AUTOMATED: CPT | Performed by: INTERNAL MEDICINE

## 2017-12-05 PROCEDURE — 74011250636 HC RX REV CODE- 250/636: Performed by: INTERNAL MEDICINE

## 2017-12-05 PROCEDURE — 74011000258 HC RX REV CODE- 258: Performed by: INTERNAL MEDICINE

## 2017-12-05 PROCEDURE — 74011636637 HC RX REV CODE- 636/637: Performed by: INTERNAL MEDICINE

## 2017-12-05 PROCEDURE — 94640 AIRWAY INHALATION TREATMENT: CPT

## 2017-12-05 PROCEDURE — 99232 SBSQ HOSP IP/OBS MODERATE 35: CPT | Performed by: INTERNAL MEDICINE

## 2017-12-05 PROCEDURE — 74011000250 HC RX REV CODE- 250: Performed by: INTERNAL MEDICINE

## 2017-12-05 RX ORDER — LOSARTAN POTASSIUM 25 MG/1
25 TABLET ORAL DAILY
Status: DISCONTINUED | OUTPATIENT
Start: 2017-12-06 | End: 2017-12-07 | Stop reason: HOSPADM

## 2017-12-05 RX ORDER — PREDNISONE 20 MG/1
40 TABLET ORAL
Status: DISCONTINUED | OUTPATIENT
Start: 2017-12-06 | End: 2017-12-06

## 2017-12-05 RX ORDER — BENZONATATE 100 MG/1
100 CAPSULE ORAL
Status: DISCONTINUED | OUTPATIENT
Start: 2017-12-05 | End: 2017-12-07 | Stop reason: HOSPADM

## 2017-12-05 RX ORDER — INSULIN GLARGINE 100 [IU]/ML
10 INJECTION, SOLUTION SUBCUTANEOUS
Status: DISCONTINUED | OUTPATIENT
Start: 2017-12-05 | End: 2017-12-05

## 2017-12-05 RX ORDER — INSULIN GLARGINE 100 [IU]/ML
15 INJECTION, SOLUTION SUBCUTANEOUS
Status: DISCONTINUED | OUTPATIENT
Start: 2017-12-05 | End: 2017-12-07 | Stop reason: HOSPADM

## 2017-12-05 RX ADMIN — IPRATROPIUM BROMIDE AND ALBUTEROL SULFATE 3 ML: .5; 3 SOLUTION RESPIRATORY (INHALATION) at 11:48

## 2017-12-05 RX ADMIN — ENOXAPARIN SODIUM 40 MG: 40 INJECTION SUBCUTANEOUS at 21:07

## 2017-12-05 RX ADMIN — Medication 10 ML: at 21:07

## 2017-12-05 RX ADMIN — OXYCODONE HYDROCHLORIDE AND ACETAMINOPHEN 1 TABLET: 10; 325 TABLET ORAL at 18:30

## 2017-12-05 RX ADMIN — Medication 10 ML: at 18:31

## 2017-12-05 RX ADMIN — CEFTRIAXONE SODIUM 1 G: 1 INJECTION, POWDER, FOR SOLUTION INTRAMUSCULAR; INTRAVENOUS at 18:30

## 2017-12-05 RX ADMIN — Medication 10 ML: at 06:00

## 2017-12-05 RX ADMIN — ENOXAPARIN SODIUM 40 MG: 40 INJECTION SUBCUTANEOUS at 09:16

## 2017-12-05 RX ADMIN — Medication 10 ML: at 21:11

## 2017-12-05 RX ADMIN — IPRATROPIUM BROMIDE AND ALBUTEROL SULFATE 3 ML: .5; 3 SOLUTION RESPIRATORY (INHALATION) at 08:56

## 2017-12-05 RX ADMIN — AZITHROMYCIN MONOHYDRATE 500 MG: 500 INJECTION, POWDER, LYOPHILIZED, FOR SOLUTION INTRAVENOUS at 21:05

## 2017-12-05 RX ADMIN — IPRATROPIUM BROMIDE AND ALBUTEROL SULFATE 3 ML: .5; 3 SOLUTION RESPIRATORY (INHALATION) at 20:56

## 2017-12-05 RX ADMIN — ONDANSETRON 4 MG: 2 INJECTION INTRAMUSCULAR; INTRAVENOUS at 21:53

## 2017-12-05 RX ADMIN — BENZONATATE 100 MG: 100 CAPSULE ORAL at 03:27

## 2017-12-05 RX ADMIN — IPRATROPIUM BROMIDE AND ALBUTEROL SULFATE 3 ML: .5; 3 SOLUTION RESPIRATORY (INHALATION) at 23:37

## 2017-12-05 RX ADMIN — OXYCODONE HYDROCHLORIDE AND ACETAMINOPHEN 1 TABLET: 10; 325 TABLET ORAL at 09:16

## 2017-12-05 RX ADMIN — INSULIN HUMAN 4 UNITS: 100 INJECTION, SOLUTION PARENTERAL at 08:08

## 2017-12-05 RX ADMIN — METHYLPREDNISOLONE SODIUM SUCCINATE 40 MG: 40 INJECTION, POWDER, FOR SOLUTION INTRAMUSCULAR; INTRAVENOUS at 06:13

## 2017-12-05 RX ADMIN — INSULIN GLARGINE 15 UNITS: 100 INJECTION, SOLUTION SUBCUTANEOUS at 21:08

## 2017-12-05 RX ADMIN — IPRATROPIUM BROMIDE AND ALBUTEROL SULFATE 3 ML: .5; 3 SOLUTION RESPIRATORY (INHALATION) at 03:05

## 2017-12-05 RX ADMIN — BENZONATATE 100 MG: 100 CAPSULE ORAL at 21:06

## 2017-12-05 RX ADMIN — INSULIN HUMAN 6 UNITS: 100 INJECTION, SOLUTION PARENTERAL at 18:31

## 2017-12-05 RX ADMIN — SERTRALINE HYDROCHLORIDE 50 MG: 50 TABLET ORAL at 08:08

## 2017-12-05 RX ADMIN — INSULIN HUMAN 10 UNITS: 100 INJECTION, SOLUTION PARENTERAL at 18:29

## 2017-12-05 RX ADMIN — INSULIN HUMAN 15 UNITS: 100 INJECTION, SOLUTION PARENTERAL at 00:52

## 2017-12-05 NOTE — PROGRESS NOTES
Patient is a potential Pneumonia Bundle Payment for Care Improvement (BPCI) patient and will be followed for 90 days post acute care. RRAT- 6  PCP- recently on disability, and needs PCP. Consider [address] [facility])    Pneumonia and Asthma education given. Continue to follow with needs.      Faizan Villegas, RN- [facility], BSN  Care Transition/ Bundled Payment Navigator  [phone]

## 2017-12-05 NOTE — PROGRESS NOTES
Problem: Falls - Risk of  Goal: *Absence of Falls  Document Warren Fall Risk and appropriate interventions in the flowsheet.    Outcome: Progressing Towards Goal  Fall Risk Interventions:            Medication Interventions: Assess postural VS orthostatic hypotension, Patient to call before getting OOB

## 2017-12-05 NOTE — ED NOTES
TRANSFER - OUT REPORT:    Verbal report given to Abhi Zafar on Sol Shutter  being transferred to [facility] 50 30 for routine progression of care       Report consisted of patients Situation, Background, Assessment and   Recommendations(SBAR). Information from the following report(s) SBAR, Kardex, ED Summary, MAR, Recent Results and Med Rec Status was reviewed with the receiving nurse. Lines:   Peripheral IV 12/04/17 Right Forearm (Active)   Site Assessment Clean, dry, & intact 12/4/2017  5:00 PM   Phlebitis Assessment 0 12/4/2017  5:00 PM   Infiltration Assessment 0 12/4/2017  5:00 PM   Dressing Status Clean, dry, & intact 12/4/2017  5:00 PM        Opportunity for questions and clarification was provided.       Patient transported with:   Lonely Sock

## 2017-12-05 NOTE — H&P
HISTORY AND PHYSICAL      [place] Nearing    12/4/2017    Date of Admission:  12/4/2017    The patient's chart is reviewed and the patient is discussed with the staff. Subjective:     Patient is a 49 y.o.  female presents with shortness of breath    Patient is known with asthma, obesity, DM on insulin who is coming in with shortness of breath for 3 days. She states that she has not been feeling well for almost 3-4 weeks and was on multiple courses of antibiotics with minimal improvement but has gotten much worse over the last couple of days not able to sleep from coughing and being short of breath. She has wheezing. No fever chills or rigors but feels exhausted. She does not smoke. No pets at home. Patient has history of breast cancer 6 yrs ago. Review of Systems  A comprehensive review of systems was negative except for that written in the HPI. Patient Active Problem List   Diagnosis Code    Community acquired bacterial pneumonia J15.9    Acute asthma exacerbation J45. 0    Type II diabetes mellitus (HCC) E11.9    Obesity E66.9       Prior to Admission Medications   Prescriptions Last Dose Informant Patient Reported? Taking? FLEXERIL 5 mg Tab  Self Yes No   Sig: take 5 mg by mouth two (2) times a day. GABAPENTIN (NEURONTIN PO)   Yes No   Sig: Take  by mouth. OTHER  Self Yes No   Sig: Takes insulin that starts with L, and starts with N    albuterol (PROVENTIL, VENTOLIN) 90 mcg/actuation inhaler   No No   Sig: Take 1-2 Puffs by inhalation every four (4) hours as needed for Wheezing. azithromycin (ZITHROMAX Z-STEFFI) 250 mg tablet   No No   Sig: Take as directed   bisoprolol-hydrochlorothiazide (ZIAC) 5-6.25 mg per tablet  Self Yes No   Sig: Take 1 Tab by mouth daily. fexofenadine-pseudoephedrine (ALLEGRA-D 24) 180-240 mg per tablet   No No   Sig: Take 1 Tab by mouth daily. furosemide (LASIX) 20 mg tablet  Self Yes No   Sig: Take 40 mg by mouth daily. meloxicam (MOBIC) 7.5 mg tablet  Self Yes No   Sig: take 7.5 mg by mouth two (2) times a day. metformin (GLUCOPHAGE) 500 mg tablet  Self Yes No   Sig: Take 1,000 mg by mouth two (2) times daily (with meals). oxyCODONE-acetaminophen (PERCOCET)  mg per tablet   Yes No   Sig: Take 1 Tab by mouth. sertraline (ZOLOFT) 50 mg tablet   Yes No   Sig: Take 50 mg by mouth daily. Facility-Administered Medications: None       Past Medical History:   Diagnosis Date    Acute asthma exacerbation 12/4/2017    [facility] ([facility]     left breaast    Community acquired bacterial pneumonia 12/4/2017    Diabetes ([facility]     Gastrointestinal disorder     GERD    Hypertension     Type II diabetes mellitus ([facility] 12/4/2017     Past Surgical History:   Procedure Laterality Date    BREAST SURGERY PROCEDURE UNLISTED      left mastectomy    HX GYN      c section    HX ORTHOPAEDIC      bilateral knee     Social History     Social History    Marital status: LEGALLY      Spouse name: N/A    Number of children: N/A    Years of education: N/A     Occupational History    Not on file. Social History Main Topics    Smoking status: Never Smoker    Smokeless tobacco: Never Used    Alcohol use No    Drug use: No    Sexual activity: Not on file     Other Topics Concern    Not on file     Social History Narrative     History reviewed. No pertinent family history. Allergies   Allergen Reactions    Adhesive Tape Rash       No current facility-administered medications for this encounter. Current Outpatient Prescriptions   Medication Sig    azithromycin (ZITHROMAX Z-STEFFI) 250 mg tablet Take as directed    fexofenadine-pseudoephedrine (ALLEGRA-D 24) 180-240 mg per tablet Take 1 Tab by mouth daily.  oxyCODONE-acetaminophen (PERCOCET)  mg per tablet Take 1 Tab by mouth.  sertraline (ZOLOFT) 50 mg tablet Take 50 mg by mouth daily.     albuterol (PROVENTIL, VENTOLIN) 90 mcg/actuation inhaler Take 1-2 Puffs by inhalation every four (4) hours as needed for Wheezing.  GABAPENTIN (NEURONTIN PO) Take  by mouth.  OTHER Takes insulin that starts with L, and starts with N     metformin (GLUCOPHAGE) 500 mg tablet Take 1,000 mg by mouth two (2) times daily (with meals).  bisoprolol-hydrochlorothiazide (ZIAC) 5-6.25 mg per tablet Take 1 Tab by mouth daily.  furosemide (LASIX) 20 mg tablet Take 40 mg by mouth daily.  meloxicam (MOBIC) 7.5 mg tablet take 7.5 mg by mouth two (2) times a day.  FLEXERIL 5 mg Tab take 5 mg by mouth two (2) times a day. Objective:     Vitals:    12/04/17 1431 12/04/17 1632 12/04/17 1700 12/04/17 1732   BP: (!) 161/99  129/79    Pulse: (!) 103  (!) 116    Resp: 20  20    Temp: 99.1 °F (37.3 °C)  98 °F (36.7 °C)    SpO2: 98% 97% 100% 99%   Weight: (!) 360 lb (163.3 kg)      Height: 5' 5\" (1.651 m)          PHYSICAL EXAM     Constitutional:  the patient is obese and in no acute distress  EENMT:  Sclera clear, pupils equal, oral mucosa moist  Respiratory: bilateral exp wheezes  Cardiovascular:  RRR without M,G,R  Gastrointestinal: soft and non-tender; with positive bowel sounds. Musculoskeletal: warm without cyanosis. There is no lower leg edema. Skin:  no jaundice or rashes, no wounds   Neurologic: no gross neuro deficits     Psychiatric:  alert and oriented x 4    CXR: right lower lobe infiltrate       Recent Labs      12/04/17   1743   WBC  5.1   HGB  12.1   HCT  36.9   PLT  262     Recent Labs      12/04/17   1743   NA  140   K  3.9   CL  105   GLU  294*   CO2  26   BUN  8   CREA  0.69   CA  8.6   ALB  3.5   TBILI  0.4   ALT  43   SGOT  39*     No results for input(s): PH, PCO2, PO2, HCO3 in the last 72 hours. No results for input(s): LCAD, LAC in the last 72 hours. Assessment:  (Medical Decision Making)     Acute severe asthma exacerbation with community acquired pneumonia. Patient is known with with DM.     Hospital Problems  Never Reviewed          Codes Class Noted POA    * (Principal)Community acquired bacterial pneumonia ICD-10-CM: J15.9  ICD-9-CM: 482.9  12/4/2017 Yes        Acute asthma exacerbation ICD-10-CM: J45.901  ICD-9-CM: 493.92  12/4/2017 Yes        Type II diabetes mellitus ([facility] ICD-10-CM: E11.9  ICD-9-CM: 250.00  12/4/2017 Yes        Obesity ICD-10-CM: E66.9  ICD-9-CM: 278.00  12/4/2017 Yes              Plan:  (Medical Decision Making)     -- Will admit for further medical management  -- Duoneb q4hrs   -- culture  -- IV steroid therapy  -- will start ceftriaxone and azithromycin for antibiotic therapy  -- sliding scale insulin   -- resume home medications     More than 50% of the time documented was spent in face-to-face contact with the patient and in the care of the patient on the floor/unit where the patient is located.     Hua Chopra MD

## 2017-12-05 NOTE — ED NOTES
Pt sitting up in bed, alert, stable, VSS and improved. Pt still with cough, productive, ready for transport.

## 2017-12-05 NOTE — PROGRESS NOTES
Dual Skin Assessment    Skin assessment completed with Howard Underwood RN. No abnormalities noted. Skin warm, dry, appropriate for ethnicity, and intact.          Maninder Collins RN    12/4/2017 11:53 PM

## 2017-12-05 NOTE — PROGRESS NOTES
. Following order set, called physician. Will give 10 units per sliding scale and continue to monitor.

## 2017-12-05 NOTE — PROGRESS NOTES
Trista Shea  Admission Date: 12/4/2017             Daily Progress Note: 12/5/2017     50 yo obese F admitted with asthma exacerbation and CAP.   Subjective:     sats 96% on RA  Worried about her blood glucose and blood pressure and has been taking her own    Review of Systems  Constitutional: negative for fevers, chills and sweats  Respiratory: positive for sputum    Current Facility-Administered Medications   Medication Dose Route Frequency    benzonatate (TESSALON) capsule 100 mg  100 mg Oral TID PRN    bisoprolol-hydroCHLOROthiazide (ZIAC) 5-6.25 mg per tablet 1 Tab (Patient Supplied)  1 Tab Oral DAILY    sertraline (ZOLOFT) tablet 50 mg  50 mg Oral DAILY    sodium chloride (NS) flush 5-10 mL  5-10 mL IntraVENous Q8H    sodium chloride (NS) flush 5-10 mL  5-10 mL IntraVENous PRN    enoxaparin (LOVENOX) injection 40 mg  40 mg SubCUTAneous Q12H    methylPREDNISolone (PF) (SOLU-MEDROL) injection 40 mg  40 mg IntraVENous Q6H    azithromycin (ZITHROMAX) 500 mg in 0.9% sodium chloride (MBP/ADV) 250 mL  500 mg IntraVENous Q24H    albuterol-ipratropium (DUO-NEB) 2.5 MG-0.5 MG/3 ML  3 mL Nebulization Q4H RT    insulin regular (NOVOLIN R, HUMULIN R) injection   SubCUTAneous TIDAC    oxyCODONE-acetaminophen (PERCOCET 10)  mg per tablet 1 Tab  1 Tab Oral Q6H PRN    cefTRIAXone (ROCEPHIN) 1 g in 0.9% sodium chloride (MBP/ADV) 50 mL  1 g IntraVENous Q24H    ondansetron (ZOFRAN) injection 4 mg  4 mg IntraVENous Q6H PRN         Objective:     Vitals:    12/05/17 0712 12/05/17 0902 12/05/17 1148 12/05/17 1150   BP: 120/61  119/72    Pulse: 94  (!) 105    Resp: 20  20    Temp: 98 °F (36.7 °C)  98.7 °F (37.1 °C)    SpO2: 98% 97% 93% 96%   Weight:       Height:         Intake and Output:      12/05 0701 - 12/05 1900  In: 360 [P.O.:360]  Out: -     Physical Exam:          Constitutional: the patient is obese  HEENT: Sclera clear, pupils equal, oral mucosa moist  Lungs: clear  Cardiovascular: RRR without M,G,R  Abd/GI: soft and non-tender; with positive bowel sounds. Ext: warm without cyanosis. There is no lower leg edema. Musculoskeletal: moves all four extremities with equal strength  Skin: no jaundice or rashes, no wounds   Neuro: no gross neuro deficits       Lines/Drains: IV  Nutrition:ADA    CHEST XRAY:       LAB  Recent Labs      12/05/17   0740  12/04/17   1743   WBC  6.8  5.1   HGB  11.8  12.1   HCT  37.2  36.9   PLT  276  262     Recent Labs      12/05/17   0740  12/04/17   1743   NA  139  140   K  4.5  3.9   CL  104  105   CO2  25  26   GLU  255*  294*   BUN  9  8   CREA  0.69  0.69         Assessment:     Patient Active Problem List   Diagnosis Code    Community acquired bacterial pneumonia J15.9    Acute asthma exacerbation J45. 901    Type II diabetes mellitus ([facility] E11.9    Obesity E66.9       Plan     Hospital Problems  Date Reviewed: 12/5/2017          Codes Class Noted POA    * (Principal)Community acquired bacterial pneumonia ICD-10-CM: J15.9  ICD-9-CM: 482.9  12/4/2017 Yes    IV rocephin and zithromax    Acute asthma exacerbation ICD-10-CM: J45.901  ICD-9-CM: 493.92  12/4/2017 Yes    BD, stetoids    Type II diabetes mellitus ([facility] (Chronic) ICD-10-CM: E11.9  ICD-9-CM: 250.00  12/4/2017 Yes    Adjust insulin    Obesity (Chronic) ICD-10-CM: E66.9  ICD-9-CM: 278.00  12/4/2017 Yes              -- continue rocephin and zithromax IV, convert to oral in the am  -- Solumedrol 40 mg Q 6 hrs, duoneb. Stop steroids  --adjust insulin, add LA. Asked her to refrain from taking her own  -- work to discharge next 24 hours    Juju Rizvi NP    More than 50% of time documented was spent in face-to-face contact with the patient and in the care of the patient on the floor/unit where the patient is located. Lungs: distant breath sounds. No wheezing  Heart S1 and S2 audible, no murmers or rubs appreciated  Other     Since initially came in with wheezing and was on 240 mg of iv steroids.  Would not just stop right away. Taper to 40 daily tomorrow for 1-2 days then 20 for 2 days, then stop  She also is on BIPAP QHS. She does not have her machine with her here. She has not been very compliant with using it. Aware of importance now. Also has some claustrophobia and told to use it during the daytime when she has complete control, then will help transition to QHS. Will need to be seen in the sleep lab for this to optimize her condition    Also with noted thrush and has been having intermittent loss of her voice. Will place her on magic mouth wash today. Hopefully home tomorrow      I have spoken with and examined the patient. I have reviewed the history, examination, assessment, and plan and agree with the above. Herrera Aly MD      This note was signed electronically.

## 2017-12-05 NOTE — PROGRESS NOTES
Pulmonologist on call notified of patient's elevated BP. No new orders received. Will continue to monitor.

## 2017-12-06 LAB
ERYTHROCYTE [DISTWIDTH] IN BLOOD BY AUTOMATED COUNT: 14.9 % (ref 11.9–14.6)
GLUCOSE BLD STRIP.AUTO-MCNC: 144 MG/DL (ref 65–100)
GLUCOSE BLD STRIP.AUTO-MCNC: 146 MG/DL (ref 65–100)
GLUCOSE BLD STRIP.AUTO-MCNC: 286 MG/DL (ref 65–100)
GLUCOSE BLD STRIP.AUTO-MCNC: 300 MG/DL (ref 65–100)
HCT VFR BLD AUTO: 37.4 % (ref 35.8–46.3)
HGB BLD-MCNC: 11.9 G/DL (ref 11.7–15.4)
MCH RBC QN AUTO: 28.1 PG (ref 26.1–32.9)
MCHC RBC AUTO-ENTMCNC: 31.8 G/DL (ref 31.4–35)
MCV RBC AUTO: 88.4 FL (ref 79.6–97.8)
PLATELET # BLD AUTO: 300 K/UL (ref 150–450)
PMV BLD AUTO: 10.6 FL (ref 10.8–14.1)
RBC # BLD AUTO: 4.23 M/UL (ref 4.05–5.25)
WBC # BLD AUTO: 10.2 K/UL (ref 4.3–11.1)

## 2017-12-06 PROCEDURE — 85027 COMPLETE CBC AUTOMATED: CPT | Performed by: NURSE PRACTITIONER

## 2017-12-06 PROCEDURE — 65270000029 HC RM PRIVATE

## 2017-12-06 PROCEDURE — 74011250636 HC RX REV CODE- 250/636: Performed by: NURSE PRACTITIONER

## 2017-12-06 PROCEDURE — 94761 N-INVAS EAR/PLS OXIMETRY MLT: CPT

## 2017-12-06 PROCEDURE — 74011000250 HC RX REV CODE- 250: Performed by: INTERNAL MEDICINE

## 2017-12-06 PROCEDURE — 77010033678 HC OXYGEN DAILY

## 2017-12-06 PROCEDURE — 99232 SBSQ HOSP IP/OBS MODERATE 35: CPT | Performed by: INTERNAL MEDICINE

## 2017-12-06 PROCEDURE — 74011250637 HC RX REV CODE- 250/637: Performed by: NURSE PRACTITIONER

## 2017-12-06 PROCEDURE — 94760 N-INVAS EAR/PLS OXIMETRY 1: CPT

## 2017-12-06 PROCEDURE — 94640 AIRWAY INHALATION TREATMENT: CPT

## 2017-12-06 PROCEDURE — 74011250637 HC RX REV CODE- 250/637: Performed by: INTERNAL MEDICINE

## 2017-12-06 PROCEDURE — 74011000258 HC RX REV CODE- 258: Performed by: INTERNAL MEDICINE

## 2017-12-06 PROCEDURE — 74011636637 HC RX REV CODE- 636/637: Performed by: NURSE PRACTITIONER

## 2017-12-06 PROCEDURE — 74011250636 HC RX REV CODE- 250/636: Performed by: INTERNAL MEDICINE

## 2017-12-06 PROCEDURE — 82962 GLUCOSE BLOOD TEST: CPT

## 2017-12-06 PROCEDURE — 74011636637 HC RX REV CODE- 636/637: Performed by: INTERNAL MEDICINE

## 2017-12-06 PROCEDURE — 36415 COLL VENOUS BLD VENIPUNCTURE: CPT | Performed by: NURSE PRACTITIONER

## 2017-12-06 RX ORDER — DOCUSATE SODIUM 100 MG/1
100 CAPSULE, LIQUID FILLED ORAL DAILY
Status: DISCONTINUED | OUTPATIENT
Start: 2017-12-06 | End: 2017-12-07 | Stop reason: HOSPADM

## 2017-12-06 RX ORDER — DOCUSATE SODIUM 100 MG/1
100 CAPSULE, LIQUID FILLED ORAL DAILY
Status: DISCONTINUED | OUTPATIENT
Start: 2017-12-07 | End: 2017-12-06

## 2017-12-06 RX ORDER — AZITHROMYCIN 500 MG/1
500 TABLET, FILM COATED ORAL DAILY
Qty: 6 TAB | Refills: 0 | Status: SHIPPED | OUTPATIENT
Start: 2017-12-06 | End: 2017-12-07

## 2017-12-06 RX ADMIN — AZITHROMYCIN MONOHYDRATE 500 MG: 500 INJECTION, POWDER, LYOPHILIZED, FOR SOLUTION INTRAVENOUS at 20:10

## 2017-12-06 RX ADMIN — LOSARTAN POTASSIUM 25 MG: 25 TABLET ORAL at 08:08

## 2017-12-06 RX ADMIN — ONDANSETRON 4 MG: 2 INJECTION INTRAMUSCULAR; INTRAVENOUS at 06:17

## 2017-12-06 RX ADMIN — INSULIN HUMAN 12 UNITS: 100 INJECTION, SOLUTION PARENTERAL at 12:25

## 2017-12-06 RX ADMIN — INSULIN HUMAN 10 UNITS: 100 INJECTION, SOLUTION PARENTERAL at 08:08

## 2017-12-06 RX ADMIN — PREDNISONE 40 MG: 20 TABLET ORAL at 08:07

## 2017-12-06 RX ADMIN — INSULIN HUMAN 9 UNITS: 100 INJECTION, SOLUTION PARENTERAL at 17:31

## 2017-12-06 RX ADMIN — INSULIN GLARGINE 15 UNITS: 100 INJECTION, SOLUTION SUBCUTANEOUS at 21:52

## 2017-12-06 RX ADMIN — Medication 10 ML: at 12:00

## 2017-12-06 RX ADMIN — ENOXAPARIN SODIUM 40 MG: 40 INJECTION SUBCUTANEOUS at 10:37

## 2017-12-06 RX ADMIN — INSULIN HUMAN 10 UNITS: 100 INJECTION, SOLUTION PARENTERAL at 12:25

## 2017-12-06 RX ADMIN — IPRATROPIUM BROMIDE AND ALBUTEROL SULFATE 3 ML: .5; 3 SOLUTION RESPIRATORY (INHALATION) at 07:45

## 2017-12-06 RX ADMIN — Medication 10 ML: at 00:15

## 2017-12-06 RX ADMIN — OXYCODONE HYDROCHLORIDE AND ACETAMINOPHEN 1 TABLET: 10; 325 TABLET ORAL at 06:17

## 2017-12-06 RX ADMIN — Medication 10 ML: at 23:27

## 2017-12-06 RX ADMIN — IPRATROPIUM BROMIDE AND ALBUTEROL SULFATE 3 ML: .5; 3 SOLUTION RESPIRATORY (INHALATION) at 15:42

## 2017-12-06 RX ADMIN — BENZONATATE 100 MG: 100 CAPSULE ORAL at 06:17

## 2017-12-06 RX ADMIN — Medication 10 ML: at 20:10

## 2017-12-06 RX ADMIN — DOCUSATE SODIUM 100 MG: 100 CAPSULE, LIQUID FILLED ORAL at 17:28

## 2017-12-06 RX ADMIN — ONDANSETRON 4 MG: 2 INJECTION INTRAMUSCULAR; INTRAVENOUS at 21:52

## 2017-12-06 RX ADMIN — INSULIN HUMAN 10 UNITS: 100 INJECTION, SOLUTION PARENTERAL at 17:29

## 2017-12-06 RX ADMIN — IPRATROPIUM BROMIDE AND ALBUTEROL SULFATE 3 ML: .5; 3 SOLUTION RESPIRATORY (INHALATION) at 19:55

## 2017-12-06 RX ADMIN — Medication 10 ML: at 08:08

## 2017-12-06 RX ADMIN — IPRATROPIUM BROMIDE AND ALBUTEROL SULFATE 3 ML: .5; 3 SOLUTION RESPIRATORY (INHALATION) at 11:36

## 2017-12-06 RX ADMIN — METHYLPREDNISOLONE SODIUM SUCCINATE 40 MG: 40 INJECTION, POWDER, FOR SOLUTION INTRAMUSCULAR; INTRAVENOUS at 20:10

## 2017-12-06 RX ADMIN — SERTRALINE HYDROCHLORIDE 50 MG: 50 TABLET ORAL at 08:08

## 2017-12-06 RX ADMIN — Medication 10 ML: at 16:00

## 2017-12-06 RX ADMIN — OXYCODONE HYDROCHLORIDE AND ACETAMINOPHEN 1 TABLET: 10; 325 TABLET ORAL at 00:14

## 2017-12-06 RX ADMIN — Medication 10 ML: at 17:32

## 2017-12-06 RX ADMIN — Medication 10 ML: at 06:17

## 2017-12-06 RX ADMIN — CEFTRIAXONE SODIUM 1 G: 1 INJECTION, POWDER, FOR SOLUTION INTRAMUSCULAR; INTRAVENOUS at 17:28

## 2017-12-06 RX ADMIN — OXYCODONE HYDROCHLORIDE AND ACETAMINOPHEN 1 TABLET: 10; 325 TABLET ORAL at 17:39

## 2017-12-06 RX ADMIN — BENZONATATE 100 MG: 100 CAPSULE ORAL at 21:52

## 2017-12-06 RX ADMIN — Medication 10 ML: at 21:53

## 2017-12-06 RX ADMIN — BENZONATATE 100 MG: 100 CAPSULE ORAL at 12:26

## 2017-12-06 NOTE — DISCHARGE SUMMARY
[facility]: Discharge Summary    Pastor Drummond    Admission date:12/4/2017    Discharge date: 12/7/2017    Admitting Diagnosis:Community acquired bacterial pneumonia    Discharge Diagnoses:    Hospital Problems  Date Reviewed: 12/5/2017          Codes Class Noted POA    Chronic respiratory failure with hypoxia [facility]) ICD-10-CM: J96.11  ICD-9-CM: 518.83, 799.02  12/7/2017 Yes        * (Principal)Community acquired bacterial pneumonia ICD-10-CM: J15.9  ICD-9-CM: 482.9  12/4/2017 Yes        Acute asthma exacerbation ICD-10-CM: J45.901  ICD-9-CM: 493.92  12/4/2017 Yes        Type II diabetes mellitus ([address] (Chronic) ICD-10-CM: E11.9  ICD-9-CM: 250.00  12/4/2017 Yes        Obesity (Chronic) ICD-10-CM: E66.9  ICD-9-CM: 278.00  12/4/2017 Yes              Consultants:  none    Studies/Procedures:  CXR    Hospital course:  50 yo obese F admitted with asthma exacerbation and community acquired pneumonia (right lower lobe). She had been seen as an outpatient at [facility] but failed to improve. She was started on IV antibiotics and steroids with frequent bronchodilators. She has known asthma and is maintained on Symbicort and prn Albuterol. She has requested a home nebulizer so this is being ordered. She is still hypoxic (room air sat 83% so needs 4 liters continuously) so home oxygen is being ordered as well. She was on this earlier this year after having pneumonia but it was weaned off. Her equipment is provided by [employer] but she may have to switch due to her insurance coverage. The  is assisting with her discharge. She has home Bipap but admittedly was not using it. This has been encouraged and she seems motivated now. She will follow up in our sleep lab and pulmonary office after discharge. Addendum: I was contacted by Philipp Becerril re home oxygen. Hypoxia is not an acceptable/qualifying diagnosis. Requested that chronic respiratory failure be used instead.  She will be reassessed when she comes back to the office to see if the hypoxia is acute or chronic but for now it will be considered chronic for insurance needs.      Discharge Exam:  Visit Vitals    /88 (BP 1 Location: Right arm, BP Patient Position: Sitting)    Pulse 95    Temp 98.4 °F (36.9 °C)    Resp 20    Ht 5' 5\" (1.651 m)    Wt (!) 360 lb (163.3 kg)    SpO2 92%    BMI 59.91 kg/m2       Discharge Medications:   Discharge Medication List as of 12/7/2017  3:46 PM      START taking these medications    Details   predniSONE (DELTASONE) 10 mg tablet Take 1 Tab by mouth daily (with breakfast). 40mg per day for 3 days, then 30mg per day for 3 days, 20mg per day for 3 days then 10mg per day for 3 days, then stop., Print, Disp-30 Tab, R-0      albuterol (PROVENTIL VENTOLIN) 2.5 mg /3 mL (0.083 %) nebulizer solution 3 mL by Nebulization route four (4) times daily. , Print, Disp-120 Each, R-11      cefpodoxime (VANTIN) 200 mg tablet Take 0.5 Tabs by mouth two (2) times a day., Print, Disp-8 Tab, R-0      fluconazole (DIFLUCAN) 150 mg tablet Take 1 Tab by mouth once for 1 dose. TAKE ONE TAB FOR CANDIDIASIS. MAY REPEAT X 1 IF NEEDED., Print, Disp-2 Tab, R-0         CONTINUE these medications which have CHANGED    Details   magic mouthwash (EDUARDO) susp Take 10 mL by mouth every four (4) hours as needed. , Print, Disp-1 Bottle, R-0      raNITIdine (ZANTAC) 150 mg tablet Take 1 Tab by mouth two (2) times daily as needed for Indigestion. , No Print, Disp-1 Tab, R-0         CONTINUE these medications which have NOT CHANGED    Details   budesonide-formoterol (SYMBICORT) 160-4.5 mcg/actuation HFAA Take 2 Puffs by inhalation two (2) times a day., Historical Med      benzonatate (TESSALON PERLES) 100 mg capsule Take 100 mg by mouth three (3) times daily as needed for Cough., Historical Med      pantoprazole (PROTONIX) 40 mg tablet Take 40 mg by mouth daily. , Historical Med      losartan (COZAAR) 25 mg tablet Take  by mouth daily. , Historical Med Liraglutide (VICTOZA) 0.6 mg/0.1 mL (18 mg/3 mL) pnij 0.6 mg by SubCUTAneous route., Historical Med      insulin detemir (LEVEMIR) 100 unit/mL injection by SubCUTAneous route nightly., Historical Med      insulin aspart (NOVOLOG) 100 unit/mL inpn by SubCUTAneous route., Historical Med      fexofenadine-pseudoephedrine (ALLEGRA-D 24) 180-240 mg per tablet Take 1 Tab by mouth daily. , Print, Disp-30 Tab, R-0      oxyCODONE-acetaminophen (PERCOCET)  mg per tablet Take 1 Tab by mouth., Historical Med      sertraline (ZOLOFT) 50 mg tablet Take 50 mg by mouth daily. , Historical Med      albuterol (PROVENTIL, VENTOLIN) 90 mcg/actuation inhaler Take 1-2 Puffs by inhalation every four (4) hours as needed for Wheezing. Print, 1-2 Puff, Disp-17 g, R-0      GABAPENTIN (NEURONTIN PO) Take  by mouth. Historical Med      OTHER Takes insulin that starts with L, and starts with N Historical Med      metformin (GLUCOPHAGE) 500 mg tablet Take 1,000 mg by mouth two (2) times daily (with meals). Historical Med, 1,000 mg      bisoprolol-hydrochlorothiazide (ZIAC) 5-6.25 mg per tablet Take 1 Tab by mouth daily. Historical Med, 1 Tab      furosemide (LASIX) 20 mg tablet Take 40 mg by mouth daily. Historical Med, 40 mg      meloxicam (MOBIC) 7.5 mg tablet take 7.5 mg by mouth two (2) times a day. Historical Med, 7.5 mg      FLEXERIL 5 mg Tab take 5 mg by mouth two (2) times a day. Historical Med, 5 mg         STOP taking these medications       azithromycin (ZITHROMAX Z-STEFFI) 250 mg tablet Comments:   Reason for Stopping:               Activity: unlimited    Diet Restrictions: diabetic. Low calorie    Disposition: stable    Followup/Outpt Studies        1. Follow up it the pulmonary office and sleep lab - separate appointments - 2 to 4 weeks  2. New PCP appointment made with Dr Indira Aervalo 12/11/17 at 4:20pm.    Yasmeen Reed NP  I have spoken with and examined the patient.  I agree with the above assessment and plan as documented. Lungs:  No active wheezing today  Heart:  RRR with no Murmur/Rubs/Gallops    --continue bronchodilators, steroid taper  --f/u at [address]. Chavez Marie NP     I have spoken with and examined the patient. I agree with the above assessment and plan as documented.     Lady Maylin MD

## 2017-12-06 NOTE — PROGRESS NOTES
Sonia Ba  Admission Date: 12/4/2017             Daily Progress Note: 12/6/2017     50 yo obese F admitted with asthma exacerbation and CAP. Subjective:     sats ~ 89-90% on RA, on 2 lpm  Family at [facility] and BG levels are better.     Review of Systems  Constitutional: negative for fevers, chills and sweats  Respiratory: positive for sputum    Current Facility-Administered Medications   Medication Dose Route Frequency    benzonatate (TESSALON) capsule 100 mg  100 mg Oral TID PRN    losartan (COZAAR) tablet 25 mg  25 mg Oral DAILY    insulin glargine (LANTUS) injection 15 Units  15 Units SubCUTAneous QHS    insulin regular (NOVOLIN R, HUMULIN R) injection 10 Units  10 Units SubCUTAneous TIDAC    insulin regular (NOVOLIN R, HUMULIN R) injection   SubCUTAneous TIDAC    predniSONE (DELTASONE) tablet 40 mg  40 mg Oral DAILY WITH BREAKFAST    magic mouthwash (EDUARDO) oral suspension 10 mL  10 mL Oral Q4H    bisoprolol-hydroCHLOROthiazide (ZIAC) 5-6.25 mg per tablet 1 Tab (Patient Supplied)  1 Tab Oral DAILY    sertraline (ZOLOFT) tablet 50 mg  50 mg Oral DAILY    sodium chloride (NS) flush 5-10 mL  5-10 mL IntraVENous Q8H    sodium chloride (NS) flush 5-10 mL  5-10 mL IntraVENous PRN    enoxaparin (LOVENOX) injection 40 mg  40 mg SubCUTAneous Q12H    azithromycin (ZITHROMAX) 500 mg in 0.9% sodium chloride (MBP/ADV) 250 mL  500 mg IntraVENous Q24H    albuterol-ipratropium (DUO-NEB) 2.5 MG-0.5 MG/3 ML  3 mL Nebulization Q4H RT    oxyCODONE-acetaminophen (PERCOCET 10)  mg per tablet 1 Tab  1 Tab Oral Q6H PRN    cefTRIAXone (ROCEPHIN) 1 g in 0.9% sodium chloride (MBP/ADV) 50 mL  1 g IntraVENous Q24H    ondansetron (ZOFRAN) injection 4 mg  4 mg IntraVENous Q6H PRN         Objective:     Vitals:    12/06/17 0742 12/06/17 0750 12/06/17 0801 12/06/17 0920   BP: (!) 148/91   122/76   Pulse: (!) 106   (!) 106   Resp: 21      Temp: 97.5 °F (36.4 °C)      SpO2: 99% (!) 79% 91% 99%   Weight:       Height: Intake and Output:   12/04 1901 - 12/06 0700  In: 600 [P.O.:600]  Out: -        Physical Exam:          Constitutional: the patient is obese  HEENT: Sclera clear, pupils equal, oral mucosa moist  Lungs: few scattered wheezes on 2 lpm  Cardiovascular: RRR without M,G,R  Abd/GI: soft and non-tender; with positive bowel sounds. Ext: warm without cyanosis. There is no lower leg edema. Musculoskeletal: moves all four extremities with equal strength  Skin: no jaundice or rashes, no wounds   Neuro: no gross neuro deficits       Lines/Drains: IV  Nutrition:ADA    CHEST XRAY:       LAB  Recent Labs      12/06/17   0712  12/05/17   0740  12/04/17   1743   WBC  10.2  6.8  5.1   HGB  11.9  11.8  12.1   HCT  37.4  37.2  36.9   PLT  300  276  262     Recent Labs      12/05/17   0740  12/04/17   1743   NA  139  140   K  4.5  3.9   CL  104  105   CO2  25  26   GLU  255*  294*   BUN  9  8   CREA  0.69  0.69         Assessment:     Patient Active Problem List   Diagnosis Code    Community acquired bacterial pneumonia J15.9    Acute asthma exacerbation J45. 901    Type II diabetes mellitus ([facility] E11.9    Obesity E66.9       Plan     Hospital Problems  Date Reviewed: 12/5/2017          Codes Class Noted POA    * (Principal)Community acquired bacterial pneumonia ICD-10-CM: J15.9  ICD-9-CM: 482.9  12/4/2017 Yes    IV rocephin and zithromax    Acute asthma exacerbation ICD-10-CM: J45.901  ICD-9-CM: 493.92  12/4/2017 Yes    BD, stetoids    Type II diabetes mellitus ([facility] (Chronic) ICD-10-CM: E11.9  ICD-9-CM: 250.00  12/4/2017 Yes    Adjust insulin    Obesity (Chronic) ICD-10-CM: E66.9  ICD-9-CM: 278.00  12/4/2017 Yes              -- continue rocephin and zithromax IV, convert to oral in the am  -- Solumedrol 40 mg Q 12 hrs, duoneb. --adjust insulin, added LA.  BG better   --assess O2 needs      [facility], MATHEW             Lungs:  Wheezing   Heart:  RRR with no Murmur/Rubs/Gallops    Additional Comments:  Still need 02, hopefully can go home in AM    I have spoken with and examined the patient. I agree with the above assessment and plan as documented.     Marli Schmidt MD

## 2017-12-06 NOTE — PROGRESS NOTES
Oxygen Qualifier       Room air: SpO2 with O2 and liter flow   Resting SpO2  87%  93% on 3L   Ambulating SpO2  86% 91% on 3L       Completed by:    Scotty Callejas, RT

## 2017-12-06 NOTE — PROGRESS NOTES
Paged Dr Dior Husbands. Pt requesting medication for menstrual cramps, something for her throat, and Mucinex. Pending call back.

## 2017-12-06 NOTE — PROGRESS NOTES
Problem: Falls - Risk of  Goal: *Absence of Falls  Document Warren Fall Risk and appropriate interventions in the flowsheet.    Outcome: Progressing Towards Goal  Fall Risk Interventions:            Medication Interventions: Evaluate medications/consider consulting pharmacy

## 2017-12-06 NOTE — PROGRESS NOTES
Patient has agreed to the Health  to follow up after discharge. Help Line number given. Will need SW assistance- multiple admissions at [facility], and they reported 'nothing wrong with her.'  Lives alone, anxiety/ obesity. Has a home oxygen concentrator through [address]. May need portable tanks.    New PCP appointment made with Dr Marta Hines 12/11/17 at 4:20pm.    Kristy Cruz RN- [facility], BSN  Care Transition/ Bundled Payment Navigator  [phone]

## 2017-12-07 VITALS
BODY MASS INDEX: 48.82 KG/M2 | HEIGHT: 65 IN | SYSTOLIC BLOOD PRESSURE: 159 MMHG | OXYGEN SATURATION: 92 % | HEART RATE: 95 BPM | DIASTOLIC BLOOD PRESSURE: 88 MMHG | RESPIRATION RATE: 20 BRPM | WEIGHT: 293 LBS | TEMPERATURE: 98.4 F

## 2017-12-07 PROBLEM — J96.11 CHRONIC RESPIRATORY FAILURE WITH HYPOXIA (HCC): Status: ACTIVE | Noted: 2017-12-07

## 2017-12-07 LAB
GLUCOSE BLD STRIP.AUTO-MCNC: 206 MG/DL (ref 65–100)
GLUCOSE BLD STRIP.AUTO-MCNC: 233 MG/DL (ref 65–100)

## 2017-12-07 PROCEDURE — 74011250637 HC RX REV CODE- 250/637: Performed by: INTERNAL MEDICINE

## 2017-12-07 PROCEDURE — 94640 AIRWAY INHALATION TREATMENT: CPT

## 2017-12-07 PROCEDURE — 94761 N-INVAS EAR/PLS OXIMETRY MLT: CPT

## 2017-12-07 PROCEDURE — 77010033678 HC OXYGEN DAILY

## 2017-12-07 PROCEDURE — 82962 GLUCOSE BLOOD TEST: CPT

## 2017-12-07 PROCEDURE — 74011636637 HC RX REV CODE- 636/637: Performed by: NURSE PRACTITIONER

## 2017-12-07 PROCEDURE — 74011250637 HC RX REV CODE- 250/637: Performed by: NURSE PRACTITIONER

## 2017-12-07 PROCEDURE — 99239 HOSP IP/OBS DSCHRG MGMT >30: CPT | Performed by: INTERNAL MEDICINE

## 2017-12-07 PROCEDURE — 74011250636 HC RX REV CODE- 250/636: Performed by: INTERNAL MEDICINE

## 2017-12-07 PROCEDURE — 94760 N-INVAS EAR/PLS OXIMETRY 1: CPT

## 2017-12-07 PROCEDURE — 74011250636 HC RX REV CODE- 250/636: Performed by: NURSE PRACTITIONER

## 2017-12-07 PROCEDURE — 77030012341 HC CHMB SPCR OPTC MDI VYRM -A

## 2017-12-07 PROCEDURE — 74011000250 HC RX REV CODE- 250: Performed by: INTERNAL MEDICINE

## 2017-12-07 RX ORDER — CEFPODOXIME PROXETIL 200 MG/1
100 TABLET, FILM COATED ORAL 2 TIMES DAILY
Qty: 8 TAB | Refills: 0 | Status: SHIPPED | OUTPATIENT
Start: 2017-12-07

## 2017-12-07 RX ORDER — PREDNISONE 10 MG/1
10 TABLET ORAL
Qty: 30 TAB | Refills: 0 | OUTPATIENT
Start: 2017-12-07 | End: 2022-05-17

## 2017-12-07 RX ORDER — CEFPODOXIME PROXETIL 200 MG/1
200 TABLET, FILM COATED ORAL
Status: COMPLETED | OUTPATIENT
Start: 2017-12-07 | End: 2017-12-07

## 2017-12-07 RX ORDER — FLUCONAZOLE 150 MG/1
150 TABLET ORAL ONCE
Qty: 2 TAB | Refills: 0 | Status: SHIPPED | OUTPATIENT
Start: 2017-12-07 | End: 2017-12-07

## 2017-12-07 RX ORDER — ALBUTEROL SULFATE 0.83 MG/ML
2.5 SOLUTION RESPIRATORY (INHALATION) 4 TIMES DAILY
Qty: 120 EACH | Refills: 11 | Status: SHIPPED | OUTPATIENT
Start: 2017-12-07

## 2017-12-07 RX ORDER — RANITIDINE 150 MG/1
150 TABLET, FILM COATED ORAL
Qty: 1 TAB | Refills: 0 | Status: SHIPPED
Start: 2017-12-07

## 2017-12-07 RX ORDER — CEFPODOXIME PROXETIL 100 MG/1
100 TABLET, FILM COATED ORAL 2 TIMES DAILY
Qty: 8 TAB | Refills: 0 | Status: SHIPPED | OUTPATIENT
Start: 2017-12-07 | End: 2017-12-07

## 2017-12-07 RX ADMIN — Medication 5 ML: at 15:43

## 2017-12-07 RX ADMIN — SERTRALINE HYDROCHLORIDE 50 MG: 50 TABLET ORAL at 09:11

## 2017-12-07 RX ADMIN — ONDANSETRON 4 MG: 2 INJECTION INTRAMUSCULAR; INTRAVENOUS at 05:21

## 2017-12-07 RX ADMIN — CEFPODOXIME PROXETIL 200 MG: 200 TABLET, FILM COATED ORAL at 15:43

## 2017-12-07 RX ADMIN — OXYCODONE HYDROCHLORIDE AND ACETAMINOPHEN 1 TABLET: 10; 325 TABLET ORAL at 05:21

## 2017-12-07 RX ADMIN — LOSARTAN POTASSIUM 25 MG: 25 TABLET ORAL at 09:11

## 2017-12-07 RX ADMIN — IPRATROPIUM BROMIDE AND ALBUTEROL SULFATE 3 ML: .5; 3 SOLUTION RESPIRATORY (INHALATION) at 16:16

## 2017-12-07 RX ADMIN — INSULIN HUMAN 6 UNITS: 100 INJECTION, SOLUTION PARENTERAL at 12:23

## 2017-12-07 RX ADMIN — OXYCODONE HYDROCHLORIDE AND ACETAMINOPHEN 1 TABLET: 10; 325 TABLET ORAL at 10:53

## 2017-12-07 RX ADMIN — DOCUSATE SODIUM 100 MG: 100 CAPSULE, LIQUID FILLED ORAL at 09:11

## 2017-12-07 RX ADMIN — IPRATROPIUM BROMIDE AND ALBUTEROL SULFATE 3 ML: .5; 3 SOLUTION RESPIRATORY (INHALATION) at 11:15

## 2017-12-07 RX ADMIN — IPRATROPIUM BROMIDE AND ALBUTEROL SULFATE 3 ML: .5; 3 SOLUTION RESPIRATORY (INHALATION) at 00:55

## 2017-12-07 RX ADMIN — INSULIN HUMAN 10 UNITS: 100 INJECTION, SOLUTION PARENTERAL at 12:23

## 2017-12-07 RX ADMIN — ONDANSETRON 4 MG: 2 INJECTION INTRAMUSCULAR; INTRAVENOUS at 10:53

## 2017-12-07 RX ADMIN — INSULIN HUMAN 6 UNITS: 100 INJECTION, SOLUTION PARENTERAL at 09:11

## 2017-12-07 RX ADMIN — IPRATROPIUM BROMIDE AND ALBUTEROL SULFATE 3 ML: .5; 3 SOLUTION RESPIRATORY (INHALATION) at 07:36

## 2017-12-07 RX ADMIN — Medication 10 ML: at 12:25

## 2017-12-07 RX ADMIN — Medication 10 ML: at 05:21

## 2017-12-07 RX ADMIN — BENZONATATE 100 MG: 100 CAPSULE ORAL at 05:21

## 2017-12-07 RX ADMIN — INSULIN HUMAN 10 UNITS: 100 INJECTION, SOLUTION PARENTERAL at 09:10

## 2017-12-07 RX ADMIN — Medication 10 ML: at 09:12

## 2017-12-07 RX ADMIN — METHYLPREDNISOLONE SODIUM SUCCINATE 40 MG: 40 INJECTION, POWDER, FOR SOLUTION INTRAMUSCULAR; INTRAVENOUS at 09:12

## 2017-12-07 NOTE — DISCHARGE INSTRUCTIONS
Pneumonia: Care Instructions  Your Care Instructions    Pneumonia is an infection of the lungs. Most cases are caused by infections from bacteria or viruses. Pneumonia may be mild or very severe. If it is caused by bacteria, you will be treated with antibiotics. It may take a few weeks to a few months to recover fully from pneumonia, depending on how sick you were and whether your overall health is good. Follow-up care is a key part of your treatment and safety. Be sure to make and go to all appointments, and call your doctor if you are having problems. It's also a good idea to know your test results and keep a list of the medicines you take. How can you care for yourself at home? · Take your antibiotics exactly as directed. Do not stop taking the medicine just because you are feeling better. You need to take the full course of antibiotics. · Take your medicines exactly as prescribed. Call your doctor if you think you are having a problem with your medicine. · Get plenty of rest and sleep. You may feel weak and tired for a while, but your energy level will improve with time. · To prevent dehydration, drink plenty of fluids, enough so that your urine is light yellow or clear like water. Choose water and other caffeine-free clear liquids until you feel better. If you have kidney, heart, or liver disease and have to limit fluids, talk with your doctor before you increase the amount of fluids you drink. · Take care of your cough so you can rest. A cough that brings up mucus from your lungs is common with pneumonia. It is one way your body gets rid of the infection. But if coughing keeps you from resting or causes severe fatigue and chest-wall pain, talk to your doctor. He or she may suggest that you take a medicine to reduce the cough. · Use a vaporizer or humidifier to add moisture to your bedroom. Follow the directions for cleaning the machine. · Do not smoke or allow others to smoke around you.  Smoke will make your cough last longer. If you need help quitting, talk to your doctor about stop-smoking programs and medicines. These can increase your chances of quitting for good. · Take an over-the-counter pain medicine, such as acetaminophen (Tylenol), ibuprofen (Advil, Motrin), or naproxen (Aleve). Read and follow all instructions on the label. · Do not take two or more pain medicines at the same time unless the doctor told you to. Many pain medicines have acetaminophen, which is Tylenol. Too much acetaminophen (Tylenol) can be harmful. · If you were given a spirometer to measure how well your lungs are working, use it as instructed. This can help your doctor tell how your recovery is going. · To prevent pneumonia in the future, talk to your doctor about getting a flu vaccine (once a year) and a pneumococcal vaccine (one time only for most people). When should you call for help? Call 911 anytime you think you may need emergency care. For example, call if:  ? · You have severe trouble breathing. ?Call your doctor now or seek immediate medical care if:  ? · You cough up dark brown or bloody mucus (sputum). ? · You have new or worse trouble breathing. ? · You are dizzy or lightheaded, or you feel like you may faint. ? Watch closely for changes in your health, and be sure to contact your doctor if:  ? · You have a new or higher fever. ? · You are coughing more deeply or more often. ? · You are not getting better after 2 days (48 hours). ? · You do not get better as expected. Where can you learn more? Go to http://amarilys-thee.info/. Enter [id number] in the search box to learn more about \"Pneumonia: Care Instructions. \"  Current as of: May 12, 2017  Content Version: 11.4  © 1393-5956 [employer]. Care instructions adapted under license by [employer] (which disclaims liability or warranty for this information).  If you have questions about a medical condition or this instruction, always ask your healthcare professional. [place] [address] any warranty or liability for your use of this information. DISCHARGE SUMMARY from Nurse    PATIENT INSTRUCTIONS:    After general anesthesia or intravenous sedation, for 24 hours or while taking prescription Narcotics:  · Limit your activities  · Do not drive and operate hazardous machinery  · Do not make important personal or business decisions  · Do  not drink alcoholic beverages  · If you have not urinated within 8 hours after discharge, please contact your surgeon on call. Report the following to your surgeon:  · Excessive pain, swelling, redness or odor of or around the surgical area  · Temperature over 100.5  · Nausea and vomiting lasting longer than 4 hours or if unable to take medications  · Any signs of decreased circulation or nerve impairment to extremity: change in color, persistent  numbness, tingling, coldness or increase pain  · Any questions    What to do at Home:  Recommended activity: Activity as tolerated, diabetic diet as tolerated. *  Please give a list of your current medications to your Primary Care Provider. *  Please update this list whenever your medications are discontinued, doses are      changed, or new medications (including over-the-counter products) are added. *  Please carry medication information at all times in case of emergency situations. These are general instructions for a healthy lifestyle:    No smoking/ No tobacco products/ Avoid exposure to second hand smoke  Surgeon General's Warning:  Quitting smoking now greatly reduces serious risk to your health.     Obesity, smoking, and sedentary lifestyle greatly increases your risk for illness    A healthy diet, regular physical exercise & weight monitoring are important for maintaining a healthy lifestyle    You may be retaining fluid if you have a history of heart failure or if you experience any of the following symptoms:  Weight gain of 3 pounds or more overnight or 5 pounds in a week, increased swelling in our hands or feet or shortness of breath while lying flat in bed. Please call your doctor as soon as you notice any of these symptoms; do not wait until your next office visit. Recognize signs and symptoms of STROKE:    F-face looks uneven    A-arms unable to move or move unevenly    S-speech slurred or non-existent    T-time-call 911 as soon as signs and symptoms begin-DO NOT go       Back to bed or wait to see if you get better-TIME IS BRAIN. Warning Signs of HEART ATTACK     Call 911 if you have these symptoms:   Chest discomfort. Most heart attacks involve discomfort in the center of the chest that lasts more than a few minutes, or that goes away and comes back. It can feel like uncomfortable pressure, squeezing, fullness, or pain.  Discomfort in other areas of the upper body. Symptoms can include pain or discomfort in one or both arms, the back, neck, jaw, or stomach.  Shortness of breath with or without chest discomfort.  Other signs may include breaking out in a cold sweat, nausea, or lightheadedness. Don't wait more than five minutes to call 911 - MINUTES MATTER! Fast action can save your life. Calling 911 is almost always the fastest way to get lifesaving treatment. Emergency Medical Services staff can begin treatment when they arrive -- up to an hour sooner than if someone gets to the hospital by car. The discharge information has been reviewed with the patient. The patient verbalized understanding. Discharge medications reviewed with the patient and appropriate educational materials and side effects teaching were provided.   ___________________________________________________________________________________________________________________________________

## 2017-12-07 NOTE — PROGRESS NOTES
[facility]  Admission Date: 12/4/2017             Daily Progress Note: 12/7/2017    The patient's chart is reviewed and the patient is discussed with the staff. Admitted with asthma exacerbation and pneumonia. Noted hypoxia - has been on home oxygen before but weaned off. Has ASIM with home bipap but was not using. Subjective:    Still coughing some. Wants home nebulizer. On Albuterol and Symbicort at home. Has home oxygen but was weaned off of earlier this year. Understands about need to use Bipap - motivated now.       Current Facility-Administered Medications   Medication Dose Route Frequency    methylPREDNISolone (PF) (SOLU-MEDROL) injection 40 mg  40 mg IntraVENous Q12H    docusate sodium (COLACE) capsule 100 mg  100 mg Oral DAILY    benzonatate (TESSALON) capsule 100 mg  100 mg Oral TID PRN    losartan (COZAAR) tablet 25 mg  25 mg Oral DAILY    insulin glargine (LANTUS) injection 15 Units  15 Units SubCUTAneous QHS    insulin regular (NOVOLIN R, HUMULIN R) injection 10 Units  10 Units SubCUTAneous TIDAC    insulin regular (NOVOLIN R, HUMULIN R) injection   SubCUTAneous TIDAC    magic mouthwash (EDUARDO) oral suspension 10 mL  10 mL Oral Q4H    bisoprolol-hydroCHLOROthiazide (ZIAC) 5-6.25 mg per tablet 1 Tab (Patient Supplied)  1 Tab Oral DAILY    sertraline (ZOLOFT) tablet 50 mg  50 mg Oral DAILY    sodium chloride (NS) flush 5-10 mL  5-10 mL IntraVENous Q8H    sodium chloride (NS) flush 5-10 mL  5-10 mL IntraVENous PRN    enoxaparin (LOVENOX) injection 40 mg  40 mg SubCUTAneous Q12H    azithromycin (ZITHROMAX) 500 mg in 0.9% sodium chloride (MBP/ADV) 250 mL  500 mg IntraVENous Q24H    albuterol-ipratropium (DUO-NEB) 2.5 MG-0.5 MG/3 ML  3 mL Nebulization Q4H RT    oxyCODONE-acetaminophen (PERCOCET 10)  mg per tablet 1 Tab  1 Tab Oral Q6H PRN    cefTRIAXone (ROCEPHIN) 1 g in 0.9% sodium chloride (MBP/ADV) 50 mL  1 g IntraVENous Q24H    ondansetron (ZOFRAN) injection 4 mg  4 mg IntraVENous Q6H PRN         Objective:     Vitals:    12/07/17 0737 12/07/17 1107 12/07/17 1116 12/07/17 1124   BP:  159/88     Pulse:  95     Resp:  20     Temp:  98.4 °F (36.9 °C)     SpO2: 100% 91% (!) 86% 90%   Weight:       Height:         Intake and Output:           Physical Exam:   Constitutional:  the patient is well developed and in no acute distress  HEENT:  Sclera clear, pupils equal, oral mucosa moist  Lungs: clear but overall decreased. No wheezing  Cardiovascular:  RRR without M,G,R  Abd/GI: soft and non-tender; with positive bowel sounds. Ext: warm without cyanosis. There is no lower leg edema. Musculoskeletal: moves all four extremities with equal strength  Skin:  no jaundice or rashes, no wounds   Neuro: no gross neuro deficits   Musculoskeletal: can ambulate. No deformity  Psychiatric: Calm. ROS: chronic dyspnea, knee pain - needs replacement  Lines: peripheral IV    CHEST XRAY:  None today    LAB  Recent Labs      12/06/17   0712  12/05/17   0740  12/04/17   1743   WBC  10.2  6.8  5.1   HGB  11.9  11.8  12.1   HCT  37.4  37.2  36.9   PLT  300  276  262     Recent Labs      12/05/17   0740  12/04/17   1743   NA  139  140   K  4.5  3.9   CL  104  105   CO2  25  26   GLU  255*  294*   BUN  9  8   CREA  0.69  0.69   ALB   --   3.5   SGOT   --   39*       Assessment:  (Medical Decision Making)     Hospital Problems  Date Reviewed: 12/5/2017          Codes Class Noted POA    * (Principal)Community acquired bacterial pneumonia ICD-10-CM: J15.9  ICD-9-CM: 482.9  12/4/2017 Yes    Day 4 abx - blood cultures neg    Acute asthma exacerbation ICD-10-CM: J45.901  ICD-9-CM: 493.92  12/4/2017 Yes    No wheezing today - still coughing some    Type II diabetes mellitus (HCC) (Chronic) ICD-10-CM: E11.9  ICD-9-CM: 250.00  12/4/2017 Yes    BS up here with steroids.  On home insulin with SSI    Obesity (Chronic) ICD-10-CM: E66.9  ICD-9-CM: 278.00  12/4/2017 Yes    Contributes to the complexity of care          Plan:  (Medical Decision Making)   1. Day 4/7 abx  2. Change to po steroids and taper off  3. SSI with Levemir for hyperglycemia - has sliding scale at home  4. Assess oxygen needs now - may need home oxygen at least short term  5. Nocturnal Bipap - already has  6. Order home nebulizer  7. Have RT instruct on spacer use with MDI. Continue Symbicort  8. Follow up with us after discharge - office and sleep lab - now on Medicare    Moncho Alejandro NP    More than 50% of time documented was spent face-to-face contact with the patient and in the care of the patient on the floor/unit where the patient is located.

## 2017-12-07 NOTE — PROGRESS NOTES
Met with pt at bedside, pt states formally had oxygen thru [facility] but no longer in network with them and was told today that they would be coming to get oxygen equipment from home. Orders faxed to [address] for continuous therapy and nebulizer, will be delivered to hospital today prior to DC. Care Management Interventions  PCP Verified by CM:  Yes  Mode of Transport at Discharge:  (family)  Confirm Follow Up Transport: Family  Plan discussed with Pt/Family/Caregiver: Yes  Freedom of Choice Offered: Yes'

## 2017-12-07 NOTE — PROGRESS NOTES
Oxygen Qualifier       Room air: SpO2 with O2 and liter flow   Resting SpO2  88% 90%on 1L   Ambulating SpO2 83%  83% on 1L  85% on 2L  88% on 3L  91% on 4L     Instructed on spacer use.     Completed by:    Bella Sheikh, RT

## 2017-12-07 NOTE — PROGRESS NOTES
Discharge instructions and prescriptions given and explained to pt. Pt verbalized understanding. Medication side effects sheet reviewed with pt. Pt to be discharged home, after DME is delivered.

## 2017-12-08 ENCOUNTER — PATIENT OUTREACH (OUTPATIENT)
Dept: RESPIRATORY THERAPY | Age: 47
End: 2017-12-08

## 2017-12-08 NOTE — PROGRESS NOTES
Oxygen order and updated progress notes faxed to [facility] as requested. Health  to follow up on Monday with home visit.

## 2017-12-08 NOTE — PROGRESS NOTES
This LMSW also obtained a signed order from Dr Lainey Lima and faxed back to [facility] as requested to finalize referral made yesterday.

## 2017-12-09 LAB
BACTERIA SPEC CULT: NORMAL
BACTERIA SPEC CULT: NORMAL
SERVICE CMNT-IMP: NORMAL
SERVICE CMNT-IMP: NORMAL

## 2017-12-11 ENCOUNTER — PATIENT OUTREACH (OUTPATIENT)
Dept: RESPIRATORY THERAPY | Age: 47
End: 2017-12-11

## 2017-12-11 PROBLEM — E66.01 OBESITY, MORBID (HCC): Status: ACTIVE | Noted: 2017-12-11

## 2017-12-12 ENCOUNTER — PATIENT OUTREACH (OUTPATIENT)
Dept: RESPIRATORY THERAPY | Age: 47
End: 2017-12-12

## 2017-12-18 ENCOUNTER — PATIENT OUTREACH (OUTPATIENT)
Dept: CASE MANAGEMENT | Age: 47
End: 2017-12-18

## 2017-12-18 NOTE — PROGRESS NOTES
CM received referral from health , patient did not return call to setup HV. Patient is a bundle patient. CM attempted outreach no answer. This note will not be viewable in [address]

## 2017-12-20 ENCOUNTER — PATIENT OUTREACH (OUTPATIENT)
Dept: CASE MANAGEMENT | Age: 47
End: 2017-12-20

## 2017-12-20 NOTE — PROGRESS NOTES
CM received referral from health , patient did not return call to setup HV. Patient is a bundle patient.     CM attempted outreach no answer. Patient has been to see PCP, pulmonary team cancelled appointment      This note will not be viewable in [address]

## 2018-01-02 ENCOUNTER — PATIENT OUTREACH (OUTPATIENT)
Dept: CASE MANAGEMENT | Age: 48
End: 2018-01-02

## 2018-01-02 NOTE — PROGRESS NOTES
CM received referral from health , patient did not return call to setup HV. Patient is a bundle patient.      CM attempted outreach no answer. Left vm     Patient has been to see PCP, pulmonary team cancelled appointment, and appears patient did not show for appointment on 1/2/18    CM reviewed careeverywhere, it appears patient is seen at [facility] by internal medicine outpatient, cardiology, rehab, and ortho.          This note will not be viewable in [address]

## 2020-11-07 ENCOUNTER — HOSPITAL ENCOUNTER (EMERGENCY)
Age: 50
Discharge: HOME | End: 2020-11-07
Attending: EMERGENCY MEDICINE
Payer: MEDICARE

## 2020-11-07 ENCOUNTER — APPOINTMENT (OUTPATIENT)
Dept: GENERAL RADIOLOGY | Age: 50
End: 2020-11-07
Attending: STUDENT IN AN ORGANIZED HEALTH CARE EDUCATION/TRAINING PROGRAM
Payer: MEDICARE

## 2020-11-07 VITALS
HEART RATE: 101 BPM | TEMPERATURE: 98.1 F | HEIGHT: 65 IN | BODY MASS INDEX: 48.82 KG/M2 | SYSTOLIC BLOOD PRESSURE: 169 MMHG | DIASTOLIC BLOOD PRESSURE: 95 MMHG | OXYGEN SATURATION: 97 % | RESPIRATION RATE: 16 BRPM | WEIGHT: 293 LBS

## 2020-11-07 DIAGNOSIS — R06.02 SOB (SHORTNESS OF BREATH): Primary | ICD-10-CM

## 2020-11-07 DIAGNOSIS — R05.9 COUGH: ICD-10-CM

## 2020-11-07 LAB
ALBUMIN SERPL-MCNC: 3.5 G/DL (ref 3.5–5)
ALBUMIN/GLOB SERPL: 0.9 {RATIO} (ref 1.2–3.5)
ALP SERPL-CCNC: 64 U/L (ref 50–136)
ALT SERPL-CCNC: 28 U/L (ref 12–65)
ANION GAP SERPL CALC-SCNC: 9 MMOL/L (ref 7–16)
AST SERPL-CCNC: 22 U/L (ref 15–37)
BASOPHILS # BLD: 0 K/UL (ref 0–0.2)
BASOPHILS NFR BLD: 0 % (ref 0–2)
BILIRUB SERPL-MCNC: 0.4 MG/DL (ref 0.2–1.1)
BUN SERPL-MCNC: 12 MG/DL (ref 6–23)
CALCIUM SERPL-MCNC: 8.5 MG/DL (ref 8.3–10.4)
CHLORIDE SERPL-SCNC: 105 MMOL/L (ref 98–107)
CO2 SERPL-SCNC: 27 MMOL/L (ref 21–32)
CREAT SERPL-MCNC: 0.87 MG/DL (ref 0.6–1)
DIFFERENTIAL METHOD BLD: NORMAL
EOSINOPHIL # BLD: 0.1 K/UL (ref 0–0.8)
EOSINOPHIL NFR BLD: 1 % (ref 0.5–7.8)
ERYTHROCYTE [DISTWIDTH] IN BLOOD BY AUTOMATED COUNT: 14 % (ref 11.9–14.6)
GLOBULIN SER CALC-MCNC: 3.9 G/DL (ref 2.3–3.5)
GLUCOSE SERPL-MCNC: 199 MG/DL (ref 65–100)
HCT VFR BLD AUTO: 39.3 % (ref 35.8–46.3)
HGB BLD-MCNC: 12.5 G/DL (ref 11.7–15.4)
IMM GRANULOCYTES # BLD AUTO: 0.1 K/UL (ref 0–0.5)
IMM GRANULOCYTES NFR BLD AUTO: 1 % (ref 0–5)
LACTATE SERPL-SCNC: 1.4 MMOL/L (ref 0.4–2)
LYMPHOCYTES # BLD: 2.4 K/UL (ref 0.5–4.6)
LYMPHOCYTES NFR BLD: 26 % (ref 13–44)
MCH RBC QN AUTO: 28.5 PG (ref 26.1–32.9)
MCHC RBC AUTO-ENTMCNC: 31.8 G/DL (ref 31.4–35)
MCV RBC AUTO: 89.7 FL (ref 79.6–97.8)
MONOCYTES # BLD: 0.8 K/UL (ref 0.1–1.3)
MONOCYTES NFR BLD: 8 % (ref 4–12)
NEUTS SEG # BLD: 6.1 K/UL (ref 1.7–8.2)
NEUTS SEG NFR BLD: 65 % (ref 43–78)
NRBC # BLD: 0 K/UL (ref 0–0.2)
PLATELET # BLD AUTO: 298 K/UL (ref 150–450)
PMV BLD AUTO: 11.3 FL (ref 9.4–12.3)
POTASSIUM SERPL-SCNC: 3.8 MMOL/L (ref 3.5–5.1)
PROT SERPL-MCNC: 7.4 G/DL (ref 6.3–8.2)
RBC # BLD AUTO: 4.38 M/UL (ref 4.05–5.2)
SODIUM SERPL-SCNC: 141 MMOL/L (ref 136–145)
TROPONIN-HIGH SENSITIVITY: 15.4 PG/ML (ref 0–14)
TROPONIN-HIGH SENSITIVITY: 15.8 PG/ML (ref 0–14)
WBC # BLD AUTO: 9.4 K/UL (ref 4.3–11.1)

## 2020-11-07 PROCEDURE — 99284 EMERGENCY DEPT VISIT MOD MDM: CPT

## 2020-11-07 PROCEDURE — 83605 ASSAY OF LACTIC ACID: CPT

## 2020-11-07 PROCEDURE — 87635 SARS-COV-2 COVID-19 AMP PRB: CPT

## 2020-11-07 PROCEDURE — 74011000250 HC RX REV CODE- 250: Performed by: EMERGENCY MEDICINE

## 2020-11-07 PROCEDURE — 77030013140 HC MSK NEB VYRM -A

## 2020-11-07 PROCEDURE — 80053 COMPREHEN METABOLIC PANEL: CPT

## 2020-11-07 PROCEDURE — 85025 COMPLETE CBC W/AUTO DIFF WBC: CPT

## 2020-11-07 PROCEDURE — 71045 X-RAY EXAM CHEST 1 VIEW: CPT

## 2020-11-07 PROCEDURE — 84484 ASSAY OF TROPONIN QUANT: CPT

## 2020-11-07 PROCEDURE — 94640 AIRWAY INHALATION TREATMENT: CPT

## 2020-11-07 PROCEDURE — 93005 ELECTROCARDIOGRAM TRACING: CPT | Performed by: STUDENT IN AN ORGANIZED HEALTH CARE EDUCATION/TRAINING PROGRAM

## 2020-11-07 RX ORDER — IPRATROPIUM BROMIDE AND ALBUTEROL SULFATE 2.5; .5 MG/3ML; MG/3ML
3 SOLUTION RESPIRATORY (INHALATION)
Status: COMPLETED | OUTPATIENT
Start: 2020-11-07 | End: 2020-11-07

## 2020-11-07 RX ORDER — BENZONATATE 100 MG/1
100 CAPSULE ORAL
Qty: 30 CAP | Refills: 0 | OUTPATIENT
Start: 2020-11-07 | End: 2022-05-17

## 2020-11-07 RX ADMIN — IPRATROPIUM BROMIDE AND ALBUTEROL SULFATE 3 ML: .5; 3 SOLUTION RESPIRATORY (INHALATION) at 16:18

## 2020-11-07 NOTE — ED NOTES
I have reviewed discharge instructions with the patient. The patient verbalized understanding. Patient left ED via Discharge Method: wheelchair to Home with self     Opportunity for questions and clarification provided. Patient given 1 scripts. To continue your aftercare when you leave the hospital, you may receive an automated call from our care team to check in on how you are doing. This is a free service and part of our promise to provide the best care and service to meet your aftercare needs.  If you have questions, or wish to unsubscribe from this service please call [phone]. Thank you for Choosing our [facility] Emergency Department.

## 2020-11-07 NOTE — ED PROVIDER NOTES
Mask was worn during the entire patient examination. Sergio Merida is a 45 y.o. female who presents to the ED with multiple complaints. Patient has history of asthma and states she has been more short of breath with productive cough. She also complains of some stuffiness in her ears. She states she has had this shortness of breath off and on for 1 month. She is already on prednisone. Symptoms anything that they have been but did feel exacerbated today. She does have oxygen that she wears at home as needed typically whenever she catches a cold or gets sick. The history is provided by the patient. Shortness of Breath   Pertinent negatives include no fever, no neck pain, no cough, no wheezing, no chest pain, no vomiting and no abdominal pain.         Past Medical History:   Diagnosis Date    Acute asthma exacerbation 12/4/2017    Cancer [facility])     [email or web address]    Community acquired bacterial pneumonia 12/4/2017    Diabetes ([facility] [address]     Gastrointestinal disorder     GERD    Hypertension     Type II diabetes mellitus ([facility] [address] 12/4/2017       Past Surgical History:   Procedure Laterality Date    BREAST SURGERY PROCEDURE UNLISTED      left mastectomy    HX GYN      c section    HX ORTHOPAEDIC      bilateral knee         Family History:   Problem Relation Age of Onset    Depression Mother     Diabetes Father     Hypertension Father        Social History     Socioeconomic History    Marital status: LEGALLY      Spouse name: Not on file    Number of children: Not on file    Years of education: Not on file    Highest education level: Not on file   Occupational History    Not on file   Social Needs    Financial resource strain: Not on file    Food insecurity     Worry: Not on file     Inability: Not on file    Transportation needs     Medical: Not on file     Non-medical: Not on file   Tobacco Use    Smoking status: Never Smoker    Smokeless tobacco: Never Used   Substance and Sexual Activity    Alcohol use: No    Drug use: No    Sexual activity: Not on file   Lifestyle    Physical activity     Days per week: Not on file     Minutes per session: Not on file    Stress: Not on file   Relationships    Social connections     Talks on phone: Not on file     Gets together: Not on file     Attends Buddhism service: Not on file     Active member of club or organization: Not on file     Attends meetings of clubs or organizations: Not on file     Relationship status: Not on file    Intimate partner violence     Fear of current or ex partner: Not on file     Emotionally abused: Not on file     Physically abused: Not on file     Forced sexual activity: Not on file   Other Topics Concern    Not on file   Social History Narrative    She is on disability for chronic back and joint pain. She is legally , has 1 grown son who is in college. ALLERGIES: Adhesive tape    Review of Systems   Constitutional: Negative for chills and fever. Respiratory: Positive for shortness of breath. Negative for apnea, cough, chest tightness, wheezing and stridor. Cardiovascular: Negative for chest pain and palpitations. Gastrointestinal: Negative for abdominal pain, diarrhea, nausea and vomiting. Genitourinary: Negative for vaginal pain. Musculoskeletal: Negative for arthralgias, neck pain and neck stiffness. Skin: Negative for color change, pallor and wound. Neurological: Negative for weakness and numbness. All other systems reviewed and are negative. Vitals:    11/07/20 1259 11/07/20 1340 11/07/20 1456   BP: (!) 176/102     Pulse: (!) 114     Resp: 18     Temp: 98.1 °F (36.7 °C)     SpO2: 91% 100% 96%   Weight: (!) 160.6 kg (354 lb)     Height: 5' 5\" (1.651 m)              Physical Exam  Vitals signs and nursing note reviewed. Constitutional:       General: She is not in acute distress. Appearance: She is well-developed.  She is not ill-appearing, toxic-appearing or diaphoretic. Interventions: She is not intubated. HENT:      Head: Normocephalic and atraumatic. Comments: Left tm is occluded. Eyes:      General: No scleral icterus. Conjunctiva/sclera: Conjunctivae normal.   Neck:      Musculoskeletal: Normal range of motion and neck supple. No neck rigidity. Cardiovascular:      Rate and Rhythm: Tachycardia present. Pulmonary:      Effort: Pulmonary effort is normal. No tachypnea, accessory muscle usage or respiratory distress. She is not intubated. Breath sounds: No stridor. Decreased breath sounds (slightly diminished breath sounds bilaterally.  ) present. No wheezing, rhonchi or rales. Chest:      Chest wall: No mass or tenderness. Skin:     General: Skin is warm. Coloration: Skin is not jaundiced or pale. Neurological:      General: No focal deficit present. Mental Status: She is alert and oriented to person, place, and time. Mental status is at baseline. Psychiatric:         Mood and Affect: Mood normal. Mood is not anxious. Behavior: Behavior normal.          MDM  Number of Diagnoses or Management Options  Cough:   SOB (shortness of breath):   Diagnosis management comments: Patient is in no distress on reevaluation. She looks well second troponin is less than the first.  Lactic acid is normal chest x-ray is unremarkable. Will do Covid swab for possibility of this she is already on steroids and has albuterol and Symbicort with her. She also has oxygen at home. Brii Carvajal MD; 11/7/2020 @4:44 PM Voice dictation software was used during the making of this note. This software is not perfect and grammatical and other typographical errors may be present.   This note has not been proofread for errors.  ===================================================================          Amount and/or Complexity of Data Reviewed  Clinical lab tests: ordered and reviewed (Results for orders placed or performed during the hospital encounter of 11/07/20  -CBC WITH AUTOMATED DIFF       Result                      Value             Ref Range           WBC                         9.4               4.3 - 11.1 K*       RBC                         4.38              4.05 - 5.2 M*       HGB                         12.5              11.7 - 15.4 *       HCT                         39.3              35.8 - 46.3 %       MCV                         89.7              79.6 - 97.8 *       MCH                         28.5              26.1 - 32.9 *       MCHC                        31.8              31.4 - 35.0 *       RDW                         14.0              11.9 - 14.6 %       PLATELET                    298               150 - 450 K/*       MPV                         11.3              9.4 - 12.3 FL       ABSOLUTE NRBC               0.00              0.0 - 0.2 K/*       DF                          AUTOMATED                             NEUTROPHILS                 65                43 - 78 %           LYMPHOCYTES                 26                13 - 44 %           MONOCYTES                   8                 4.0 - 12.0 %        EOSINOPHILS                 1                 0.5 - 7.8 %         BASOPHILS                   0                 0.0 - 2.0 %         IMMATURE GRANULOCYTES       1                 0.0 - 5.0 %         ABS. NEUTROPHILS            6.1               1.7 - 8.2 K/*       ABS. LYMPHOCYTES            2.4               0.5 - 4.6 K/*       ABS. MONOCYTES              0.8               0.1 - 1.3 K/*       ABS. EOSINOPHILS            0.1               0.0 - 0.8 K/*       ABS. BASOPHILS              0.0               0.0 - 0.2 K/*       ABS. IMM.  GRANS.            0.1               0.0 - 0.5 K/*  -METABOLIC PANEL, COMPREHENSIVE       Result                      Value             Ref Range           Sodium                      141               136 - 145 mm*       Potassium                   3.8               3.5 - 5.1 mm* Chloride                    105               98 - 107 mmo*       CO2                         27                21 - 32 mmol*       Anion gap                   9                 7 - 16 mmol/L       Glucose                     199 (H)           65 - 100 mg/*       BUN                         12                6 - 23 MG/DL        Creatinine                  0.87              0.6 - 1.0 MG*       GFR est AA                  >60               >60 ml/min/1*       GFR est non-AA              >60               >60 ml/min/1*       Calcium                     8.5               8.3 - 10.4 M*       Bilirubin, total            0.4               0.2 - 1.1 MG*       ALT (SGPT)                  28                12 - 65 U/L         AST (SGOT)                  22                15 - 37 U/L         Alk. phosphatase            64                50 - 136 U/L        Protein, total              7.4               6.3 - 8.2 g/*       Albumin                     3.5               3.5 - 5.0 g/*       Globulin                    3.9 (H)           2.3 - 3.5 g/*       A-G Ratio                   0.9 (L)           1.2 - 3.5      -TROPONIN-HIGH SENSITIVITY       Result                      Value             Ref Range           Troponin-High Sensitiv*     15.8 (H)          0 - 14 pg/mL   -LACTIC ACID       Result                      Value             Ref Range           Lactic acid                 1.4               0.4 - 2.0 MM*  -TROPONIN-HIGH SENSITIVITY       Result                      Value             Ref Range           Troponin-High Sensitiv*     15.4 (H)          0 - 14 pg/mL   )  Tests in the radiology section of CPT®: reviewed and ordered (Xr Chest Port    Result Date: 11/7/2020  Portable chest x-ray CLINICAL INDICATION: Shortness of breath FINDINGS: Single AP view the chest compared to a similar exam dated 12/4/2017 show the lungs to be expanded and clear. No pleural effusion or pneumothorax.  Surgical clips are noted in the left axilla.      IMPRESSION: No acute cardiopulmonary abnormality.   )           Procedures

## 2020-11-07 NOTE — ED TRIAGE NOTES
Pt complains of cough and increasing SOB x 1 month. States she was seen in office and was improving but feels worse today. She was scheduled to be seen outpt but unable to get up this morning. States she treated with prednisone.

## 2020-11-08 LAB
ATRIAL RATE: 108 BPM
CALCULATED P AXIS, ECG09: 68 DEGREES
CALCULATED R AXIS, ECG10: 8 DEGREES
CALCULATED T AXIS, ECG11: 49 DEGREES
DIAGNOSIS, 93000: NORMAL
P-R INTERVAL, ECG05: 192 MS
Q-T INTERVAL, ECG07: 322 MS
QRS DURATION, ECG06: 82 MS
QTC CALCULATION (BEZET), ECG08: 431 MS
SARS COV-2, XPGCVT: NEGATIVE
SOURCE, COVRS: NORMAL
VENTRICULAR RATE, ECG03: 108 BPM

## 2020-11-09 ENCOUNTER — PATIENT OUTREACH (OUTPATIENT)
Dept: CASE MANAGEMENT | Age: 50
End: 2020-11-09

## 2020-11-09 NOTE — PROGRESS NOTES
Date/Time:  11/9/2020 10:50 AM   Call within 2 business days of discharge: Yes   Attempted to reach patient by telephone. Left HIPPA compliant message requesting a return call. Will attempt to reach patient again.

## 2020-11-09 NOTE — PROGRESS NOTES
Patient contacted regarding COVID-19  risk. Discussed COVID-19 related testing which was available at this time. Test results were negative. Patient informed of results, if available? yes. Outreach made within 2 business days of discharge: Yes    Care Transition Nurse/ Ambulatory Care Manager/ LPN Care Coordinator contacted the patient by telephone to perform post discharge assessment. Verified name and  with patient as identifiers. Provided introduction to self, and explanation of the CTN/ACM/LPN role, and reason for call due to risk factors for infection and/or exposure to COVID-19. Symptoms reviewed with patient who verbalized the following symptoms: no new symptoms and no worsening symptoms. Due to no new or worsening symptoms encounter was not routed to provider for escalation. Discussed follow-up appointments. If no appointment was previously scheduled, appointment scheduling offered: pt to [facility] follow up appointment(s): No future appointments. Non-[facility] follow up appointment(s): n/a      Advance Care Planning:   Does patient have an Advance Directive: currently not on file; education provided     Patient has following risk factors of: see h&p. CTN/ACM/LPN reviewed discharge instructions, medical action plan and red flags such as increased shortness of breath, increasing fever and signs of decompensation with patient who verbalized understanding. Discussed exposure protocols and quarantine with CDC Guidelines What to do if you are sick with coronavirus disease .  Patient was given an opportunity for questions and concerns. The patient agrees to contact the [facility] exposure line [phone], [place] [address] [address]: ([phone]) and PCP office for questions related to their healthcare. CTN/ACM provided contact information for future needs.     Reviewed and educated patient on any new and changed medications related to discharge diagnosis. Patient/family/caregiver given information for [employer] and agrees to enroll no  Patient's preferred e-mail:  n/a  Patient's preferred phone number: n/a  Based on Loop alert triggers, patient will be contacted by nurse care manager for worsening symptoms. Plan for follow up in 7-14 days based on severity of symptoms and risk factors.

## 2022-05-17 ENCOUNTER — APPOINTMENT (OUTPATIENT)
Dept: CT IMAGING | Age: 52
End: 2022-05-17
Attending: EMERGENCY MEDICINE
Payer: MEDICARE

## 2022-05-17 ENCOUNTER — APPOINTMENT (OUTPATIENT)
Dept: GENERAL RADIOLOGY | Age: 52
End: 2022-05-17
Attending: EMERGENCY MEDICINE
Payer: MEDICARE

## 2022-05-17 ENCOUNTER — HOSPITAL ENCOUNTER (EMERGENCY)
Age: 52
Discharge: HOME | End: 2022-05-17
Attending: EMERGENCY MEDICINE
Payer: MEDICARE

## 2022-05-17 VITALS
TEMPERATURE: 98.6 F | HEART RATE: 102 BPM | SYSTOLIC BLOOD PRESSURE: 161 MMHG | OXYGEN SATURATION: 98 % | DIASTOLIC BLOOD PRESSURE: 92 MMHG | BODY MASS INDEX: 48.82 KG/M2 | HEIGHT: 65 IN | WEIGHT: 293 LBS | RESPIRATION RATE: 20 BRPM

## 2022-05-17 DIAGNOSIS — R07.9 ACUTE CHEST PAIN: Primary | ICD-10-CM

## 2022-05-17 DIAGNOSIS — J20.9 ACUTE BRONCHITIS WITH BRONCHOSPASM: ICD-10-CM

## 2022-05-17 LAB
ALBUMIN SERPL-MCNC: 3.3 G/DL (ref 3.5–5)
ALBUMIN/GLOB SERPL: 0.7 {RATIO} (ref 1.2–3.5)
ALP SERPL-CCNC: 64 U/L (ref 50–136)
ALT SERPL-CCNC: 31 U/L (ref 12–65)
ANION GAP SERPL CALC-SCNC: 6 MMOL/L (ref 7–16)
AST SERPL-CCNC: 24 U/L (ref 15–37)
ATRIAL RATE: 108 BPM
BASOPHILS # BLD: 0 K/UL (ref 0–0.2)
BASOPHILS NFR BLD: 0 % (ref 0–2)
BILIRUB SERPL-MCNC: 0.4 MG/DL (ref 0.2–1.1)
BUN SERPL-MCNC: 12 MG/DL (ref 6–23)
CALCIUM SERPL-MCNC: 8.8 MG/DL (ref 8.3–10.4)
CALCULATED P AXIS, ECG09: 69 DEGREES
CALCULATED R AXIS, ECG10: 7 DEGREES
CALCULATED T AXIS, ECG11: 48 DEGREES
CHLORIDE SERPL-SCNC: 102 MMOL/L (ref 98–107)
CO2 SERPL-SCNC: 29 MMOL/L (ref 21–32)
CREAT SERPL-MCNC: 0.9 MG/DL (ref 0.6–1)
D DIMER PPP FEU-MCNC: 0.85 UG/ML(FEU)
DIAGNOSIS, 93000: NORMAL
DIFFERENTIAL METHOD BLD: ABNORMAL
EOSINOPHIL # BLD: 0 K/UL (ref 0–0.8)
EOSINOPHIL NFR BLD: 0 % (ref 0.5–7.8)
ERYTHROCYTE [DISTWIDTH] IN BLOOD BY AUTOMATED COUNT: 16.9 % (ref 11.9–14.6)
GLOBULIN SER CALC-MCNC: 4.7 G/DL (ref 2.3–3.5)
GLUCOSE SERPL-MCNC: 230 MG/DL (ref 65–100)
HCT VFR BLD AUTO: 30.5 % (ref 35.8–46.3)
HGB BLD-MCNC: 9.3 G/DL (ref 11.7–15.4)
IMM GRANULOCYTES # BLD AUTO: 0 K/UL (ref 0–0.5)
IMM GRANULOCYTES NFR BLD AUTO: 0 % (ref 0–5)
LIPASE SERPL-CCNC: 86 U/L (ref 73–393)
LYMPHOCYTES # BLD: 1 K/UL (ref 0.5–4.6)
LYMPHOCYTES NFR BLD: 13 % (ref 13–44)
MAGNESIUM SERPL-MCNC: 1.7 MG/DL (ref 1.8–2.4)
MCH RBC QN AUTO: 26.1 PG (ref 26.1–32.9)
MCHC RBC AUTO-ENTMCNC: 30.5 G/DL (ref 31.4–35)
MCV RBC AUTO: 85.7 FL (ref 79.6–97.8)
MONOCYTES # BLD: 0.9 K/UL (ref 0.1–1.3)
MONOCYTES NFR BLD: 12 % (ref 4–12)
NEUTS SEG # BLD: 5.6 K/UL (ref 1.7–8.2)
NEUTS SEG NFR BLD: 75 % (ref 43–78)
NRBC # BLD: 0 K/UL (ref 0–0.2)
P-R INTERVAL, ECG05: 165 MS
PLATELET # BLD AUTO: 276 K/UL (ref 150–450)
PMV BLD AUTO: 11.3 FL (ref 9.4–12.3)
POTASSIUM SERPL-SCNC: 3.9 MMOL/L (ref 3.5–5.1)
PROT SERPL-MCNC: 8 G/DL (ref 6.3–8.2)
Q-T INTERVAL, ECG07: 349 MS
QRS DURATION, ECG06: 96 MS
QTC CALCULATION (BEZET), ECG08: 468 MS
RBC # BLD AUTO: 3.56 M/UL (ref 4.05–5.2)
SODIUM SERPL-SCNC: 137 MMOL/L (ref 136–145)
TROPONIN-HIGH SENSITIVITY: 13.6 PG/ML (ref 0–14)
TROPONIN-HIGH SENSITIVITY: 18 PG/ML (ref 0–14)
VENTRICULAR RATE, ECG03: 108 BPM
WBC # BLD AUTO: 7.5 K/UL (ref 4.3–11.1)

## 2022-05-17 PROCEDURE — 74011000250 HC RX REV CODE- 250: Performed by: EMERGENCY MEDICINE

## 2022-05-17 PROCEDURE — 71045 X-RAY EXAM CHEST 1 VIEW: CPT

## 2022-05-17 PROCEDURE — 93005 ELECTROCARDIOGRAM TRACING: CPT | Performed by: EMERGENCY MEDICINE

## 2022-05-17 PROCEDURE — 96374 THER/PROPH/DIAG INJ IV PUSH: CPT

## 2022-05-17 PROCEDURE — 85025 COMPLETE CBC W/AUTO DIFF WBC: CPT

## 2022-05-17 PROCEDURE — 74011000636 HC RX REV CODE- 636: Performed by: EMERGENCY MEDICINE

## 2022-05-17 PROCEDURE — 94664 DEMO&/EVAL PT USE INHALER: CPT

## 2022-05-17 PROCEDURE — 74011000258 HC RX REV CODE- 258: Performed by: EMERGENCY MEDICINE

## 2022-05-17 PROCEDURE — 77010033678 HC OXYGEN DAILY

## 2022-05-17 PROCEDURE — 83735 ASSAY OF MAGNESIUM: CPT

## 2022-05-17 PROCEDURE — 94760 N-INVAS EAR/PLS OXIMETRY 1: CPT

## 2022-05-17 PROCEDURE — 96375 TX/PRO/DX INJ NEW DRUG ADDON: CPT

## 2022-05-17 PROCEDURE — 84484 ASSAY OF TROPONIN QUANT: CPT

## 2022-05-17 PROCEDURE — 85379 FIBRIN DEGRADATION QUANT: CPT

## 2022-05-17 PROCEDURE — 74011250637 HC RX REV CODE- 250/637: Performed by: EMERGENCY MEDICINE

## 2022-05-17 PROCEDURE — 83690 ASSAY OF LIPASE: CPT

## 2022-05-17 PROCEDURE — 99285 EMERGENCY DEPT VISIT HI MDM: CPT

## 2022-05-17 PROCEDURE — 80053 COMPREHEN METABOLIC PANEL: CPT

## 2022-05-17 PROCEDURE — 74011250636 HC RX REV CODE- 250/636: Performed by: EMERGENCY MEDICINE

## 2022-05-17 PROCEDURE — 94762 N-INVAS EAR/PLS OXIMTRY CONT: CPT

## 2022-05-17 PROCEDURE — 94640 AIRWAY INHALATION TREATMENT: CPT

## 2022-05-17 PROCEDURE — 71260 CT THORAX DX C+: CPT

## 2022-05-17 RX ORDER — HYDROCODONE BITARTRATE AND ACETAMINOPHEN 7.5; 325 MG/1; MG/1
1 TABLET ORAL
Status: COMPLETED | OUTPATIENT
Start: 2022-05-17 | End: 2022-05-17

## 2022-05-17 RX ORDER — IPRATROPIUM BROMIDE AND ALBUTEROL SULFATE 2.5; .5 MG/3ML; MG/3ML
3 SOLUTION RESPIRATORY (INHALATION)
Status: COMPLETED | OUTPATIENT
Start: 2022-05-17 | End: 2022-05-17

## 2022-05-17 RX ORDER — LORAZEPAM 2 MG/ML
1 INJECTION INTRAMUSCULAR
Status: COMPLETED | OUTPATIENT
Start: 2022-05-17 | End: 2022-05-17

## 2022-05-17 RX ORDER — FLUCONAZOLE 150 MG/1
150 TABLET ORAL
Qty: 2 TABLET | Refills: 1 | Status: SHIPPED | OUTPATIENT
Start: 2022-05-17

## 2022-05-17 RX ORDER — PREDNISONE 20 MG/1
40 TABLET ORAL DAILY
Qty: 10 TABLET | Refills: 0 | Status: SHIPPED | OUTPATIENT
Start: 2022-05-17 | End: 2022-05-22

## 2022-05-17 RX ORDER — DOXYCYCLINE HYCLATE 100 MG
100 TABLET ORAL 2 TIMES DAILY
Qty: 14 TABLET | Refills: 0 | Status: SHIPPED | OUTPATIENT
Start: 2022-05-17

## 2022-05-17 RX ORDER — SODIUM CHLORIDE 0.9 % (FLUSH) 0.9 %
10 SYRINGE (ML) INJECTION
Status: COMPLETED | OUTPATIENT
Start: 2022-05-17 | End: 2022-05-17

## 2022-05-17 RX ORDER — SODIUM CHLORIDE 0.9 % (FLUSH) 0.9 %
5-10 SYRINGE (ML) INJECTION AS NEEDED
Status: DISCONTINUED | OUTPATIENT
Start: 2022-05-17 | End: 2022-05-17 | Stop reason: HOSPADM

## 2022-05-17 RX ORDER — ALBUTEROL SULFATE 0.83 MG/ML
2.5 SOLUTION RESPIRATORY (INHALATION)
Qty: 25 EACH | Refills: 0 | Status: SHIPPED | OUTPATIENT
Start: 2022-05-17

## 2022-05-17 RX ORDER — SODIUM CHLORIDE 0.9 % (FLUSH) 0.9 %
5-10 SYRINGE (ML) INJECTION EVERY 8 HOURS
Status: DISCONTINUED | OUTPATIENT
Start: 2022-05-17 | End: 2022-05-17 | Stop reason: HOSPADM

## 2022-05-17 RX ORDER — BENZONATATE 100 MG/1
200 CAPSULE ORAL ONCE
Status: COMPLETED | OUTPATIENT
Start: 2022-05-17 | End: 2022-05-17

## 2022-05-17 RX ORDER — BENZONATATE 200 MG/1
200 CAPSULE ORAL
Qty: 15 CAPSULE | Refills: 0 | Status: SHIPPED | OUTPATIENT
Start: 2022-05-17 | End: 2022-05-22

## 2022-05-17 RX ORDER — DEXAMETHASONE SODIUM PHOSPHATE 100 MG/10ML
10 INJECTION INTRAMUSCULAR; INTRAVENOUS
Status: COMPLETED | OUTPATIENT
Start: 2022-05-17 | End: 2022-05-17

## 2022-05-17 RX ADMIN — LORAZEPAM 1 MG: 2 INJECTION INTRAMUSCULAR; INTRAVENOUS at 10:31

## 2022-05-17 RX ADMIN — SODIUM CHLORIDE 100 ML: 9 INJECTION, SOLUTION INTRAVENOUS at 09:37

## 2022-05-17 RX ADMIN — DEXAMETHASONE SODIUM PHOSPHATE 10 MG: 10 INJECTION INTRAMUSCULAR; INTRAVENOUS at 06:56

## 2022-05-17 RX ADMIN — SODIUM CHLORIDE, PRESERVATIVE FREE 10 ML: 5 INJECTION INTRAVENOUS at 09:37

## 2022-05-17 RX ADMIN — IOPAMIDOL 100 ML: 755 INJECTION, SOLUTION INTRAVENOUS at 12:01

## 2022-05-17 RX ADMIN — SODIUM CHLORIDE, PRESERVATIVE FREE 10 ML: 5 INJECTION INTRAVENOUS at 06:59

## 2022-05-17 RX ADMIN — IPRATROPIUM BROMIDE AND ALBUTEROL SULFATE 3 ML: .5; 3 SOLUTION RESPIRATORY (INHALATION) at 08:58

## 2022-05-17 RX ADMIN — BENZONATATE 200 MG: 100 CAPSULE ORAL at 06:54

## 2022-05-17 RX ADMIN — HYDROCODONE BITARTRATE AND ACETAMINOPHEN 1 TABLET: 7.5; 325 TABLET ORAL at 08:51

## 2022-05-17 RX ADMIN — IPRATROPIUM BROMIDE AND ALBUTEROL SULFATE 3 ML: .5; 3 SOLUTION RESPIRATORY (INHALATION) at 05:29

## 2022-05-17 NOTE — ED NOTES
I have reviewed discharge instructions with the patient. The patient verbalized understanding. Patient left ED via Discharge Method: ambulatory to Home with Sister in [place]. Opportunity for questions and clarification provided. Patient given 5 scripts. To continue your aftercare when you leave the hospital, you may receive an automated call from our care team to check in on how you are doing. This is a free service and part of our promise to provide the best care and service to meet your aftercare needs.  If you have questions, or wish to unsubscribe from this service please call [phone]. Thank you for Choosing our [facility] Emergency Department.
Spoke with .  D Dimer to be added on to specimen already in lab
Principal Discharge DX:	Adverse reaction to drug, initial encounter

## 2022-05-17 NOTE — ED PROVIDER NOTES
48-year-old female brought in by EMS due to chest pain. Started about 2 days ago. She has had some cough. Brown sputum. May be coughed up a little bit of blood. No vomiting diarrhea no fever. Also has a history of hypertension diabetes. States COPD. Records reflect that she has Sarcoidosis. She is on prednisone. She is oxygen dependent 2 L. Pain is substernal and goes to the left shoulder. No particular back pain. No swelling in her legs. Review of records reveals breast cancer as well. Has inhalers at home. She has a nebulizer but it is broken. States she is controlling the cough but ran out of [employer]. No history of MI    The history is provided by the patient. Chest Pain (Angina)   This is a new problem. The current episode started 2 days ago. The problem has not changed since onset. The pain is associated with breathing and coughing. The pain is present in the left side and substernal region. The pain is moderate. The quality of the pain is described as sharp and stabbing. The pain radiates to the left shoulder. The symptoms are aggravated by deep breathing. Associated symptoms include cough, diaphoresis, malaise/fatigue and shortness of breath. Pertinent negatives include no abdominal pain, no back pain, no fever, no headaches and no vomiting. Her past medical history is significant for cancer and HTN. Her past medical history does not include DM, DVT or PE.         Past Medical History:   Diagnosis Date    Acute asthma exacerbation 12/4/2017    Cancer ([facility] [address]     left breaast    Community acquired bacterial pneumonia 12/4/2017    Diabetes ([facility] [address]     Gastrointestinal disorder     GERD    Hypertension     Type II diabetes mellitus ([facility] [address] 12/4/2017       Past Surgical History:   Procedure Laterality Date    BREAST SURGERY PROCEDURE UNLISTED      left mastectomy    HX GYN      c section    HX ORTHOPAEDIC      bilateral knee         Family History:   Problem Relation Age of Onset    Depression Mother     Diabetes Father     Hypertension Father        Social History     Socioeconomic History    Marital status: LEGALLY      Spouse name: Not on file    Number of children: Not on file    Years of education: Not on file    Highest education level: Not on file   Occupational History    Not on file   Tobacco Use    Smoking status: Never Smoker    Smokeless tobacco: Never Used   Substance and Sexual Activity    Alcohol use: No    Drug use: No    Sexual activity: Not on file   Other Topics Concern    Not on file   Social History Narrative    She is on disability for chronic back and joint pain. She is legally , has 1 grown son who is in college. Social Determinants of Health     Financial Resource Strain:     Difficulty of Paying Living Expenses: Not on file   Food Insecurity:     Worried About Running Out of Food in the Last Year: Not on file    [place] of Food in the Last Year: Not on file   Transportation Needs:     Lack of Transportation (Medical): Not on file    Lack of Transportation (Non-Medical):  Not on file   Physical Activity:     Days of Exercise per Week: Not on file    Minutes of Exercise per Session: Not on file   Stress:     Feeling of Stress : Not on file   Social Connections:     Frequency of Communication with Friends and Family: Not on file    Frequency of Social Gatherings with Friends and Family: Not on file    Attends Evangelical Services: Not on file    Active Member of Clubs or Organizations: Not on file    Attends Club or Organization Meetings: Not on file    Marital Status: Not on file   Intimate Partner Violence:     Fear of Current or Ex-Partner: Not on file    Emotionally Abused: Not on file    Physically Abused: Not on file    Sexually Abused: Not on file   Housing Stability:     Unable to Pay for Housing in the Last Year: Not on file    Number of [place] in the Last Year: Not on file    Unstable Housing in the Last Year: Not on file         ALLERGIES: Adhesive tape    Review of Systems   Constitutional: Positive for diaphoresis and malaise/fatigue. Negative for fever. Respiratory: Positive for cough and shortness of breath. Cardiovascular: Positive for chest pain. Gastrointestinal: Negative for abdominal pain and vomiting. Musculoskeletal: Negative for back pain. Neurological: Negative for headaches. All other systems reviewed and are negative. Vitals:    05/17/22 0443 05/17/22 0446 05/17/22 0525 05/17/22 0530   BP:       Pulse:       Resp:       Temp:  99.1 °F (37.3 °C)     SpO2:   99% 97%   Weight: 143.1 kg (315 lb 8 oz)      Height: 5' 5\" (1.651 m)               Physical Exam  Vitals and nursing note reviewed. Constitutional:       General: She is not in acute distress. Appearance: She is well-developed. HENT:      Head: Normocephalic and atraumatic. Right Ear: External ear normal.      Left Ear: External ear normal.      Mouth/Throat:      Pharynx: No oropharyngeal exudate. Eyes:      Conjunctiva/sclera: Conjunctivae normal.      Pupils: Pupils are equal, round, and reactive to light. Cardiovascular:      Rate and Rhythm: Normal rate and regular rhythm. Heart sounds: No murmur heard. Pulmonary:      Effort: No respiratory distress. Breath sounds: Wheezing and rhonchi present. Abdominal:      General: Bowel sounds are normal.      Palpations: Abdomen is soft. There is no mass. Tenderness: There is no abdominal tenderness. There is no guarding or rebound. Hernia: No hernia is present. Musculoskeletal:      Cervical back: Normal range of motion and neck supple. Right lower leg: No tenderness. No edema. Left lower leg: No tenderness. No edema. Skin:     General: Skin is warm and dry. Neurological:      Mental Status: She is alert and oriented to person, place, and time.       Gait: Gait normal.      Comments: Nl speech   Psychiatric: Speech: Speech normal.          MDM  Number of Diagnoses or Management Options  Diagnosis management comments: Cough, wheezing and shortness of breath and chest pain. Most likely bronchitis with exacerbation of COPD. Chest x-ray to rule out pneumothorax or pneumonia. Check EKG and troponins. Given history of cancer and hemoptysis, screen for PE with D-dimer. Nebulizer treatment IV steroids       Amount and/or Complexity of Data Reviewed  Clinical lab tests: ordered and reviewed  Tests in the radiology section of CPT®: ordered and reviewed  Tests in the medicine section of CPT®: ordered and reviewed  Review and summarize past medical records: yes  Independent visualization of images, tracings, or specimens: yes (Interpretation EKG reveals sinus tachycardia 108. Nonspecific ST-T changes. No ectopy.   Normal QT interval.)    Risk of Complications, Morbidity, and/or Mortality  Presenting problems: moderate  Diagnostic procedures: minimal  Management options: low    Patient Progress  Patient progress: stable         Procedures    Results Include:    Recent Results (from the past 24 hour(s))   EKG, 12 LEAD, INITIAL    Collection Time: 05/17/22  4:46 AM   Result Value Ref Range    Ventricular Rate 108 BPM    Atrial Rate 108 BPM    P-R Interval 165 ms    QRS Duration 96 ms    Q-T Interval 349 ms    QTC Calculation (Bezet) 468 ms    Calculated P Axis 69 degrees    Calculated R Axis 7 degrees    Calculated T Axis 48 degrees    Diagnosis       Sinus tachycardia  Otherwise normal ECG    Confirmed by Asim Sargent ([id number]) on 5/17/2022 6:57:56 AM     TROPONIN-HIGH SENSITIVITY    Collection Time: 05/17/22  5:17 AM   Result Value Ref Range    Troponin-High Sensitivity 18.0 (H) 0 - 14 pg/mL   CBC WITH AUTOMATED DIFF    Collection Time: 05/17/22  5:17 AM   Result Value Ref Range    WBC 7.5 4.3 - 11.1 K/uL    RBC 3.56 (L) 4.05 - 5.2 M/uL    HGB 9.3 (L) 11.7 - 15.4 g/dL    HCT 30.5 (L) 35.8 - 46.3 %    MCV 85.7 79.6 - 97.8 FL MCH 26.1 26.1 - 32.9 PG    MCHC 30.5 (L) 31.4 - 35.0 g/dL    RDW 16.9 (H) 11.9 - 14.6 %    PLATELET 585 550 - 530 K/uL    MPV 11.3 9.4 - 12.3 FL    ABSOLUTE NRBC 0.00 0.0 - 0.2 K/uL    DF AUTOMATED      NEUTROPHILS 75 43 - 78 %    LYMPHOCYTES 13 13 - 44 %    MONOCYTES 12 4.0 - 12.0 %    EOSINOPHILS 0 (L) 0.5 - 7.8 %    BASOPHILS 0 0.0 - 2.0 %    IMMATURE GRANULOCYTES 0 0.0 - 5.0 %    ABS. NEUTROPHILS 5.6 1.7 - 8.2 K/UL    ABS. LYMPHOCYTES 1.0 0.5 - 4.6 K/UL    ABS. MONOCYTES 0.9 0.1 - 1.3 K/UL    ABS. EOSINOPHILS 0.0 0.0 - 0.8 K/UL    ABS. BASOPHILS 0.0 0.0 - 0.2 K/UL    ABS. IMM. GRANS. 0.0 0.0 - 0.5 K/UL   METABOLIC PANEL, COMPREHENSIVE    Collection Time: 05/17/22  5:17 AM   Result Value Ref Range    Sodium 137 136 - 145 mmol/L    Potassium 3.9 3.5 - 5.1 mmol/L    Chloride 102 98 - 107 mmol/L    CO2 29 21 - 32 mmol/L    Anion gap 6 (L) 7 - 16 mmol/L    Glucose 230 (H) 65 - 100 mg/dL    BUN 12 6 - 23 MG/DL    Creatinine 0.90 0.6 - 1.0 MG/DL    GFR est AA >60 >60 ml/min/1.73m2    GFR est non-AA >60 >60 ml/min/1.73m2    Calcium 8.8 8.3 - 10.4 MG/DL    Bilirubin, total 0.4 0.2 - 1.1 MG/DL    ALT (SGPT) 31 12 - 65 U/L    AST (SGOT) 24 15 - 37 U/L    Alk. phosphatase 64 50 - 136 U/L    Protein, total 8.0 6.3 - 8.2 g/dL    Albumin 3.3 (L) 3.5 - 5.0 g/dL    Globulin 4.7 (H) 2.3 - 3.5 g/dL    A-G Ratio 0.7 (L) 1.2 - 3.5     LIPASE    Collection Time: 05/17/22  5:17 AM   Result Value Ref Range    Lipase 86 73 - 393 U/L   MAGNESIUM    Collection Time: 05/17/22  5:17 AM   Result Value Ref Range    Magnesium 1.7 (L) 1.8 - 2.4 mg/dL   D DIMER    Collection Time: 05/17/22  5:17 AM   Result Value Ref Range    D DIMER 0.85 (H) <0.56 ug/ml(FEU)   TROPONIN-HIGH SENSITIVITY    Collection Time: 05/17/22  7:47 AM   Result Value Ref Range    Troponin-High Sensitivity 13.6 0 - 14 pg/mL     XR CHEST PORT    Result Date: 5/17/2022  EXAM: Chest x-ray. INDICATION: Chest pain. COMPARISON: Prior chest x-ray on November 7, 2020.  TECHNIQUE: Frontal view chest x-ray. FINDINGS: The heart is borderline enlarged. There is consolidation in the bilateral perihilar and right lower lung fields. No pneumothorax or pleural effusion is seen. Again noted are surgical clips in the left axilla. Bilateral lung consolidation probably relates to pulmonary edema from CHF or fluid overload. Pneumonia is not entirely excluded. CT CHEST PULMONARY EMBOLISM    Result Date: 5/17/2022  CT OF THE CHEST WITH INTRAVENOUS CONTRAST, 5/17/2022 Indication: Cough and chest pain. . Comparison: None Technique:   2.5 mm axial scans from above the aortic arch to the lung bases following the uneventful administration of 70 mL of [id number]. Intravenous contrast was given to evaluate for pulmonary embolism. All CT scans performed at this facility use one or all of the following: Automated exposure control, adjustment of the mA and/or kVp according to patient's size, iterative reconstruction. Findings: Multiple prominent left supraclavicular lymph nodes are seen. Additional prominent mediastinal and bilateral hilar lymph nodes are seen. .  The thoracic aorta is normal in caliber. Opacification of the pulmonary arteries is suboptimal. Evaluation is furthermore limited by patient breathing motion artifact as well as dense contrast within central venous structures. No clearly demonstrated pulmonary embolism is seen. Evaluation with lung windows demonstrates nonspecific bilateral lung infiltrates seen throughout the right lung and in the left lower lobe. These favors a central line. No pleural effusion is seen. Lungs are expanded without evidence for pneumothorax. No acute osseous abnormality is seen. The patient is status post left mastectomy. There is an enhancing mass in the anterior chest wall soft tissues seen on axial image 39 measuring 2.7 cm in size Limited evaluation of the upper abdomen demonstrates no clear acute abnormality.      1.  Limited assessment for pulmonary embolism without convincing evidence for pulmonary embolism. 2. Patient status post left mastectomy. There is an enhancing mass in the anterior chest wall soft tissues measuring 2.7 cm in size. Additional prominent left supraclavicular lymph nodes are seen which are incompletely characterized. The possibility of metastatic lesions which may or may not be known is not excluded. Additional prominent mediastinal and hilar lymph nodes are seen which could represent additional tomas metastases although could also be reactive. 3. Nonspecific bilateral lung infiltrates. These do favor the central lungs which can been indicator pulmonary edema although additional potential inflammatory etiologies are not excluded if suggested by clinical findings. This report was made using voice transcription. Despite my best efforts to avoid any, transcription errors may persist. If there is any question about the accuracy of the report or need for clarification, then please call [phone], or text me through perfectserv for clarification or correction. Apathy could be from sarcoid but we will asked patient to recheck with their oncologist given history of breast cancer.   To biotics, cough medication, prednisone

## 2022-05-17 NOTE — ED TRIAGE NOTES
Patient arrives via EMS from home with cough, chest pain. States that she started coughing 3 days ago, developed chest pain yesterday. States was going to see her doctor today but it got worse. Patient history of COPD, normally wears 2L of oxygen at home.

## 2022-05-17 NOTE — DISCHARGE INSTRUCTIONS
Medications as directed. Finish antibiotic. Continue to use inhaler. Recheck sooner for worse or worrisome symptoms. Call your primary care doctor to recheck 2 to 3 days if not improving.